# Patient Record
Sex: MALE | Race: WHITE | NOT HISPANIC OR LATINO | Employment: FULL TIME | URBAN - METROPOLITAN AREA
[De-identification: names, ages, dates, MRNs, and addresses within clinical notes are randomized per-mention and may not be internally consistent; named-entity substitution may affect disease eponyms.]

---

## 2017-01-05 ENCOUNTER — ALLSCRIPTS OFFICE VISIT (OUTPATIENT)
Dept: OTHER | Facility: OTHER | Age: 37
End: 2017-01-05

## 2017-02-08 ENCOUNTER — ALLSCRIPTS OFFICE VISIT (OUTPATIENT)
Dept: OTHER | Facility: OTHER | Age: 37
End: 2017-02-08

## 2017-06-01 ENCOUNTER — ALLSCRIPTS OFFICE VISIT (OUTPATIENT)
Dept: OTHER | Facility: OTHER | Age: 37
End: 2017-06-01

## 2017-06-12 ENCOUNTER — TRANSCRIBE ORDERS (OUTPATIENT)
Dept: ADMINISTRATIVE | Facility: HOSPITAL | Age: 37
End: 2017-06-12

## 2017-06-12 DIAGNOSIS — M54.31 SCIATICA OF RIGHT SIDE: Primary | ICD-10-CM

## 2017-06-23 ENCOUNTER — ALLSCRIPTS OFFICE VISIT (OUTPATIENT)
Dept: OTHER | Facility: OTHER | Age: 37
End: 2017-06-23

## 2017-06-23 ENCOUNTER — HOSPITAL ENCOUNTER (OUTPATIENT)
Dept: RADIOLOGY | Facility: HOSPITAL | Age: 37
Discharge: HOME | End: 2017-06-23
Payer: COMMERCIAL

## 2017-06-23 DIAGNOSIS — M54.31 SCIATICA OF RIGHT SIDE: ICD-10-CM

## 2017-06-23 DIAGNOSIS — M25.551 PAIN IN RIGHT HIP: ICD-10-CM

## 2017-06-23 PROCEDURE — 73502 X-RAY EXAM HIP UNI 2-3 VIEWS: CPT

## 2017-06-26 ENCOUNTER — GENERIC CONVERSION - ENCOUNTER (OUTPATIENT)
Dept: OTHER | Facility: OTHER | Age: 37
End: 2017-06-26

## 2017-10-03 ENCOUNTER — ALLSCRIPTS OFFICE VISIT (OUTPATIENT)
Dept: OTHER | Facility: OTHER | Age: 37
End: 2017-10-03

## 2017-10-03 DIAGNOSIS — M54.50 LOW BACK PAIN: ICD-10-CM

## 2017-10-05 NOTE — PROGRESS NOTES
Assessment  1  Lumbar pain (724 2) (M54 5)    Plan  Chronic sciatica, right    · PredniSONE 10 MG Oral Tablet  Chronic sciatica, right, Hip pain, right    · Gabapentin 300 MG Oral Capsule  Lumbar pain    · Meloxicam 15 MG Oral Tablet; TAKE 1 TABLET BY MOUTH EVERY DAY   · * MRI LUMBAR SPINE WO CONTRAST; Status:Need Information - Financial Authorization; Requested  RTH:[id number];    · *1 - [facility]  pt 2-3 times per week 6 weeks evaluate and treat   Status: Active  Requested for: 68RKO1896  Care Summary provided  : Yes    Discussion/Summary  Discussion Summary:   1 patient with right-sided sciatica did not respond to home therapy and 2 courses of prednisone will Rx meloxicam will wean him off of gabapentin by taking 300 mg 1 tablet twice a day for 3 days then one tablet once at night for 3 days and then stopping, will order physical therapy we'll check MRI of the lumbar spine x-ray of the right hip was unremarkable continuing of home maintenance a core strengthening program stress once pain has resolved we'll see him back if not improved  Counseling Documentation With Imm: The patient was counseled regarding diagnostic results,-instructions for management,-risk factor reductions,-prognosis,-impressions,-risks and benefits of treatment options  Medication SE Review and Pt Understands Tx: Possible side effects of new medications were reviewed with the patient/guardian today  The treatment plan was reviewed with the patient/guardian  The patient/guardian understands and agrees with the treatment plan      Chief Complaint  Chief Complaint Free Text Note Form: Patient presents for f/u offers c/o pain and burning on his RT leg  History of Present Illness  HPI: right lower back to right shin front knee strap not effective   Sciatica, Chronic (Brief): The patient is being seen for worsening symptoms of chronic sciatica        Review of Systems  Complete-Male:   Constitutional: No fever or chills, feels well, no tiredness, no recent weight gain or weight loss  Eyes: No complaints of eye pain, no red eyes, no discharge from eyes, no itchy eyes  ENT: no complaints of earache, no hearing loss, no nosebleeds, no nasal discharge, no sore throat, no hoarseness  Cardiovascular: No complaints of slow heart rate, no fast heart rate, no chest pain, no palpitations, no leg claudication, no lower extremity  Respiratory: No complaints of shortness of breath, no wheezing, no cough, no SOB on exertion, no orthopnea or PND  Gastrointestinal: No complaints of abdominal pain, no constipation, no nausea or vomiting, no diarrhea or bloody stools  Genitourinary: No complaints of dysuria, no incontinence, no hesitancy, no nocturia, no genital lesion, no testicular pain  Musculoskeletal: as noted in HPI  Integumentary: as noted in HPI  Neurological: as noted in HPI  Active Problems  1  Abdominal pain (789 00) (R10 9)   2  Benign familial tremor (333 1) (G25 0)   3  Chondromalacia of right patella (717 7) (M22 41)   4  Chronic sciatica, right (724 3) (M54 31)   5  Dysplastic nevi (216 9) (D23 9)   6  Erectile dysfunction of non-organic origin (302 72) (F52 21)   7  Fatty liver disease, nonalcoholic (780 8) (J92 1)   8  Hip pain, right (719 45) (M25 551)   9  History of HTN (hypertension), benign (401 1) (I10)   10  Low HDL (under 40) (272 5) (E78 6)   11  Lumbar pain (724 2) (M54 5)   12  Mild intermittent asthma with acute exacerbation (493 92) (J45 21)   13  Obesity due to excess calories, unspecified obesity severity (278 00) (E66 09)   14  EDMOND on CPAP (327 23,V46 8) (G47 33,Z99 89)    Past Medical History  1  History of Difficulty attaining erection (607 84) (N52 9)   2  History of Fear of needles (300 29) (F40 298)   3  History of asthma (V12 69) (Z87 09)   4  History of backache (V13 59) (Z87 39)   5  History of kidney disease (V13 09) (Z87 448)   6  History of pneumonia (V12 61) (Z87 01)   7  History of varicella (V12 09) (Z86 19)   8  History of wheezing (V12 69) (Z87 898)   9  History of HTN (hypertension), benign (401 1) (I10)   10  History of Low HDL (under 40) (272 5) (E78 6)   11  History of Slow urinary stream (788 62) (R39 198)  Active Problems And Past Medical History Reviewed: The active problems and past medical history were reviewed and updated today  Surgical History  1  History of Root Canal  Surgical History Reviewed: The surgical history was reviewed and updated today  Family History  Mother    1  Family history of hypertension (V17 49) (Z82 49)   2  Family history of multiple sclerosis (V17 2) (Z82 0)   3  Family history of tremor (V17 2) (Z82 0)  Father    4  Family history of Gout, arthritis  Brother    5  Family history of multiple sclerosis (V17 2) (Z82 0)   6  Family history of tremor (V17 2) (Z82 0)  Grandmother    7  Family history of malignant neoplasm (V16 9) (Z80 9)  Grandfather    8  Family history of cerebrovascular accident (CVA) (V17 1) (Z82 3)  Aunt    5  Family history of asthma (V17 5) (Z82 5)  Paternal Uncle    8  Family history of multiple sclerosis (V17 2) (Z82 0)   11  Family history of tremor (V17 2) (Z82 0)  Family History Reviewed: The family history was reviewed and updated today  Social History   · Always uses seat belt   · Denies alcohol consumption (V49 89) (Z78 9)   · Never a smoker  Social History Reviewed: The social history was reviewed and updated today  The social history was reviewed and is unchanged  Current Meds   1  Cialis 20 MG Oral Tablet; TAKE 1 TABLET 1 HOUR BEFORE ACTIVITY AS NEEDED  Requested for:   26Vfi8900; Last Rx:99Dkj1200 Ordered   2  Culturelle Oral Capsule; Therapy: (Recorded:01Qlg9471) to Recorded   3  Gabapentin 300 MG Oral Capsule; TAKE 1 CAPSULE 3 times daily; Therapy: 68ERF8920 to (Evaluate:26Vnq6057)  Requested for: 23QIE1935; Last JH:60SYO9326   Ordered   4   Nighttime Sleep Aid TABS; TAKE 1 TABLET AT BEDTIME  MDD:50MG; Therapy: (Recorded:05Jan2017) to Recorded   5  PredniSONE 10 MG Oral Tablet; TAKE 4 TABLETS DAILY FOR 3 DAYS,3 TABLETS DAILY FOR 3   DAYS, 2 TABLETS DAILY FOR 3 DAYS AND 1 TABLET DAILY FOR 3 DAYS, THEN STOP; Therapy: 97KQP7635 to (Last Rx:23Jun2017)  Requested for: 64HKO7030 Ordered   6  ProAir  (90 Base) MCG/ACT Inhalation Aerosol Solution; INHALE 2 PUFFS EVERY 4 HOURS   AS NEEDED  Requested for: 57KNX5320; Last IE:10ROH1154 Ordered   7  Vitamin D3 2000 UNIT Oral Capsule; TAKE 1 CAPSULE EVERY DAY; Therapy: 67CHL4602 to (Rusty Lopez) Recorded   8  Vitamin E 400 UNIT Oral Capsule; Therapy: (Recorded:05Jan2017) to Recorded  Medication List Reviewed: The medication list was reviewed and updated today  Allergies  1  Penicillins  2  Dust   3  Mold    Vitals  Vital Signs    Recorded: 28AWU2457 05:00PM Recorded: 27PMQ9464 04:34PM   Temperature  98 7 F   Heart Rate  96   Systolic 392, LUE, Sitting    Diastolic 75, LUE, Sitting    Height  6 ft    Weight  270 lb    BMI Calculated  36 62   BSA Calculated  2 42   O2 Saturation  98     Physical Exam    Constitutional   General appearance: Abnormal   appears healthy-and-obese  Neck   Neck: Supple, symmetric, trachea midline, no masses  Thyroid: Normal, no thyromegaly  Pulmonary   Auscultation of lungs: Clear to auscultation  Cardiovascular   Auscultation of heart: Normal rate and rhythm, normal S1 and S2, no murmurs  Carotid pulses: 2+ bilaterally  Abdomen   Abdomen: Non-tender, no masses  Liver and spleen: No hepatomegaly or splenomegaly  Musculoskeletal Straight leg raising was 80° on the left and 45° on the right -Range of motion of the right and left hip was within normal limits  Skin Some dysplastic nevi on abdomen     Neurologic   Cranial nerves: Cranial nerves 2-12 intact  -intention tremor minimal    Psychiatric   Judgment and insight: Normal     Orientation to person, place and time: Normal  Recent and remote memory: Intact      Mood and affect: Normal        Future Appointments    Date/Time Provider Specialty Site   02/20/2018 04:45 PM Mike Perdomo DO Internal Medicine [facility] INTERNAL MED [address] [place] [address]     Signatures   Electronically signed by : Haven Johnson DO; Oct  4 2017  8:00AM EST                       (Author)

## 2018-01-12 VITALS
HEIGHT: 72 IN | SYSTOLIC BLOOD PRESSURE: 120 MMHG | HEART RATE: 96 BPM | OXYGEN SATURATION: 98 % | WEIGHT: 270 LBS | TEMPERATURE: 98.7 F | BODY MASS INDEX: 36.57 KG/M2 | DIASTOLIC BLOOD PRESSURE: 75 MMHG

## 2018-01-12 NOTE — RESULT NOTES
Verified Results  [facility] SMALL BOWEL [id number] 07:58AM Boby Sole Order Number: [id number]   Performing Comments: small bowel series/SBFT   - Patient Instructions: To schedule this appointment, please contact Central Scheduling at [phone]  Test Name Result Flag Reference   [place] SMALL BOWEL (Report)     SMALL BOWEL FOLLOW THROUGH     INDICATION: Outside institution abnormal CT examination  Intermittent abdominal pain  History of hepatic steatosis     COMPARISON:  There are no prior   studies at [address] [place] [address]  FLUOROSCOPY TIME: 0 9 minutes     IMAGES: 17     TECHNIQUE:   view of the abdomen was obtained  Oral contrast was then administered to the patient and followed through the small bowel to the colon utilizing overhead radiographs at periodic intervals  Spot imaging of the terminal ileum was also    performed  FINDINGS:      view of the abdomen is unremarkable  The stomach demonstrates normal contour without discernible filling defect  Normal caliber small bowel loops  There is a normal fold pattern and thickness  No intraluminal filling defects, abnormal bowel separation or fistula identified  The    transit time of approximately 1 hour 20 minutes is normal     Multiple spot views of the terminal ileum demonstrate no evidence of stricture or inflammatory change         IMPRESSION:     Normal small bowel follow-through       Workstation performed: [id number]     Signed by:   Pietro Gonzalez MD   5/27/16   710

## 2018-01-14 VITALS
SYSTOLIC BLOOD PRESSURE: 130 MMHG | OXYGEN SATURATION: 99 % | WEIGHT: 250.13 LBS | HEART RATE: 72 BPM | BODY MASS INDEX: 33.88 KG/M2 | HEIGHT: 72 IN | DIASTOLIC BLOOD PRESSURE: 78 MMHG | TEMPERATURE: 98.6 F

## 2018-01-14 VITALS
OXYGEN SATURATION: 96 % | DIASTOLIC BLOOD PRESSURE: 80 MMHG | HEIGHT: 72 IN | SYSTOLIC BLOOD PRESSURE: 128 MMHG | BODY MASS INDEX: 34.67 KG/M2 | TEMPERATURE: 98.6 F | WEIGHT: 256 LBS | HEART RATE: 99 BPM

## 2018-01-14 VITALS
SYSTOLIC BLOOD PRESSURE: 120 MMHG | BODY MASS INDEX: 33.74 KG/M2 | HEART RATE: 75 BPM | TEMPERATURE: 98.5 F | WEIGHT: 249.13 LBS | HEIGHT: 72 IN | DIASTOLIC BLOOD PRESSURE: 72 MMHG | OXYGEN SATURATION: 98 %

## 2018-01-14 VITALS
SYSTOLIC BLOOD PRESSURE: 120 MMHG | TEMPERATURE: 97.9 F | OXYGEN SATURATION: 99 % | DIASTOLIC BLOOD PRESSURE: 68 MMHG | HEART RATE: 90 BPM | WEIGHT: 262.25 LBS | HEIGHT: 72 IN | BODY MASS INDEX: 35.52 KG/M2

## 2018-01-17 NOTE — RESULT NOTES
Verified Results  * XR HIP/PELV 2-3 VWS RIGHT W PELVIS IF PERFORMED 40QLS5104 05:02PM Oziel NICOLE Order Number: [id number]     Test Name Result Flag Reference   * XR HIP/PELV 2-3 VWS RIGHT (Report)     RIGHT HIP     INDICATION: M54 31: Sciatica, right side   M25 551: Pain in right hip  History taken directly from the electronic ordering system  COMPARISON: None     VIEWS: AP pelvis and 2 coned down views of the hip     IMAGES: 3     FINDINGS:     There is no acute fracture or dislocation  No degenerative changes  No lytic or blastic lesions are seen  Soft tissues are unremarkable  IMPRESSION:     No acute osseous abnormality         Workstation performed: [id number]     Signed by:   Allen Cerda MD   6/26/17

## 2018-01-29 ENCOUNTER — TELEPHONE (OUTPATIENT)
Dept: INTERNAL MEDICINE CLINIC | Facility: CLINIC | Age: 38
End: 2018-01-29

## 2018-01-29 DIAGNOSIS — M54.32 SCIATICA OF LEFT SIDE: Primary | ICD-10-CM

## 2018-01-29 NOTE — TELEPHONE ENCOUNTER
Pt had an old order in allscripts for physical therapy for right sciatic pain that he put off til now  Pt needs a new referral, since they are only good for 30 days      Pt uses [address], apt [place] [address]

## 2018-01-30 ENCOUNTER — TELEPHONE (OUTPATIENT)
Dept: INTERNAL MEDICINE CLINIC | Facility: CLINIC | Age: 38
End: 2018-01-30

## 2018-01-30 NOTE — TELEPHONE ENCOUNTER
Pt stopped by,  Set up appointment for tomorrow with hussain Soler in a lot of sciatic pain and   Still has his meloxican 15 mg from last visit, but its not effective  Can he up the dose of this till tomorrow he is asking?

## 2018-01-31 ENCOUNTER — OFFICE VISIT (OUTPATIENT)
Dept: INTERNAL MEDICINE CLINIC | Facility: CLINIC | Age: 38
End: 2018-01-31
Payer: COMMERCIAL

## 2018-01-31 VITALS
OXYGEN SATURATION: 97 % | SYSTOLIC BLOOD PRESSURE: 120 MMHG | TEMPERATURE: 99.2 F | HEART RATE: 81 BPM | DIASTOLIC BLOOD PRESSURE: 75 MMHG

## 2018-01-31 DIAGNOSIS — M54.31 CHRONIC SCIATICA, RIGHT: Primary | ICD-10-CM

## 2018-01-31 PROBLEM — G25.0 BENIGN FAMILIAL TREMOR: Status: ACTIVE | Noted: 2017-01-05

## 2018-01-31 PROBLEM — M22.41 CHONDROMALACIA OF RIGHT PATELLA: Status: ACTIVE | Noted: 2017-02-08

## 2018-01-31 PROBLEM — D23.9 DYSPLASTIC NEVI: Status: ACTIVE | Noted: 2017-02-08

## 2018-01-31 PROBLEM — M25.551 HIP PAIN, RIGHT: Status: ACTIVE | Noted: 2017-06-23

## 2018-01-31 PROBLEM — E78.6 LOW HDL (UNDER 40): Status: ACTIVE | Noted: 2017-02-08

## 2018-01-31 PROCEDURE — 99213 OFFICE O/P EST LOW 20 MIN: CPT | Performed by: INTERNAL MEDICINE

## 2018-01-31 RX ORDER — TADALAFIL 20 MG/1
1 TABLET ORAL
COMMUNITY
End: 2018-02-20 | Stop reason: SDUPTHER

## 2018-01-31 RX ORDER — LACTOBACILLUS RHAMNOSUS GG 10B CELL
CAPSULE ORAL
COMMUNITY
End: 2022-08-08 | Stop reason: ALTCHOICE

## 2018-01-31 RX ORDER — GABAPENTIN 100 MG/1
CAPSULE ORAL
Qty: 30 CAPSULE | Refills: 1 | Status: SHIPPED | OUTPATIENT
Start: 2018-01-31 | End: 2018-08-02 | Stop reason: SDUPTHER

## 2018-01-31 RX ORDER — ACETAMINOPHEN 160 MG
1 TABLET,DISINTEGRATING ORAL DAILY
COMMUNITY
Start: 2017-01-05

## 2018-01-31 RX ORDER — MELOXICAM 15 MG/1
TABLET ORAL
COMMUNITY
Start: 2018-01-30 | End: 2018-01-31

## 2018-01-31 RX ORDER — ALBUTEROL SULFATE 90 UG/1
2 AEROSOL, METERED RESPIRATORY (INHALATION) EVERY 4 HOURS PRN
COMMUNITY
End: 2018-02-20 | Stop reason: SDUPTHER

## 2018-01-31 NOTE — PROGRESS NOTES
Assessment/Plan:    1  Patient with right sciatica burning pain in his leg will try and hold off on ibuprofen restart gabapentin increased to 3 tablets at night will be getting chiropractor treatments as well as home exercises once his pain resolves he can start weaning him off of the gabapentin by 1 tablet a week if he does not improve in the next month will get MRI may consider referral for facet or epidural injections  Patient had screening laboratory work may consider getting them if financially possible     Diagnoses and all orders for this visit:    Chronic sciatica, right    Other orders  -     tadalafil (CIALIS) 20 MG tablet; Take 1 tablet by mouth  -     Lactobacillus-Inulin ([address]) CAPS; Take by mouth  -     meloxicam (MOBIC) 15 mg tablet;   -     diphenhydrAMINE (NIGHTTIME SLEEP AID) 25 MG tablet; Take by mouth  -     albuterol (PROAIR HFA) 90 mcg/act inhaler; Inhale 2 puffs every 4 (four) hours as needed  -     Cholecalciferol (VITAMIN D3) 2000 units capsule; Take 1 capsule by mouth daily  -     gabapentin (NEURONTIN) 100 mg capsule; 1 tab hs for 3 days may increase by 1 every 3 days until max 3 tabs hs        The patient was counseled regarding instructions for management, risk factor reductions, patient and family education,impressions, risks and benefits of treatment options, side effects of medications, importance of compliance with treatment  The treatment plan was reviewed with the patient/guardian and patient/guardian understands and agrees with the treatment plan  Subjective:      Patient ID: Clifford Martínez is a 40 y o  male  Friday increased pain went to no burning pain 2-3 yesterday in AM 8-9        The following portions of the patient's history were reviewed and updated as appropriate:   He has no past medical history on file  ,   does not have any pertinent problems on file  ,   has no past surgical history on file  ,  family history is not on file  , reports that he has never smoked  He has never used smokeless tobacco  He reports that he does not drink alcohol or use drugs  ,  is allergic to dust mite extract; molds & smuts; and penicillins       Review of Systems   Constitutional: Negative for appetite change, chills, fatigue, fever and unexpected weight change  HENT: Negative for congestion, ear pain, facial swelling, hearing loss, mouth sores, nosebleeds, postnasal drip, rhinorrhea, sinus pain, sore throat, trouble swallowing and voice change  Eyes: Negative for pain, discharge, redness and visual disturbance  Respiratory: Negative for apnea, chest tightness, shortness of breath, wheezing and stridor  Cardiovascular: Negative for chest pain, palpitations and leg swelling  Gastrointestinal: Negative for abdominal distention, abdominal pain, blood in stool, constipation, diarrhea and vomiting  Endocrine: Negative for cold intolerance, heat intolerance, polydipsia, polyphagia and polyuria  Genitourinary: Negative for difficulty urinating, dysuria, flank pain, frequency, genital sores, hematuria and urgency  Musculoskeletal: Negative for arthralgias and back pain  Skin: Negative for rash and wound  Allergic/Immunologic: Negative for environmental allergies, food allergies and immunocompromised state  Neurological: Negative for dizziness, tremors, seizures, syncope, facial asymmetry, speech difficulty, weakness, light-headedness, numbness and headaches  Hematological: Negative for adenopathy  Does not bruise/bleed easily  Psychiatric/Behavioral: Negative for agitation, behavioral problems, dysphoric mood, hallucinations, self-injury, sleep disturbance and suicidal ideas  The patient is not hyperactive  Objective:     Physical Exam   Constitutional: He is oriented to person, place, and time  He appears well-developed     HENT:   Right Ear: External ear normal    Left Ear: External ear normal    Eyes: Right eye exhibits no discharge  Left eye exhibits no discharge  No scleral icterus  Neck: Carotid bruit is not present  No tracheal deviation present  No thyroid mass and no thyromegaly present  Cardiovascular: Normal rate, regular rhythm, normal heart sounds and intact distal pulses  Exam reveals no gallop and no friction rub  No murmur heard  Pulmonary/Chest: No respiratory distress  He has no wheezes  He has no rales  Musculoskeletal: He exhibits no edema  Straight leg raising on the right is decreased with pain straight leg raising on the left 90 degrees without pain has no weakness in the lower extremities   Lymphadenopathy:     He has no cervical adenopathy  Neurological: He is alert and oriented to person, place, and time  Coordination normal    Psychiatric: He has a normal mood and affect  His behavior is normal  Judgment and thought content normal    Nursing note and vitals reviewed

## 2018-02-08 DIAGNOSIS — G47.33 OBSTRUCTIVE SLEEP APNEA: ICD-10-CM

## 2018-02-08 DIAGNOSIS — I10 ESSENTIAL (PRIMARY) HYPERTENSION: ICD-10-CM

## 2018-02-08 DIAGNOSIS — G25.0 ESSENTIAL TREMOR: ICD-10-CM

## 2018-02-08 DIAGNOSIS — E78.6 LIPOPROTEIN DEFICIENCY: ICD-10-CM

## 2018-02-20 ENCOUNTER — OFFICE VISIT (OUTPATIENT)
Dept: INTERNAL MEDICINE CLINIC | Facility: CLINIC | Age: 38
End: 2018-02-20
Payer: COMMERCIAL

## 2018-02-20 VITALS
DIASTOLIC BLOOD PRESSURE: 75 MMHG | OXYGEN SATURATION: 97 % | TEMPERATURE: 97.4 F | SYSTOLIC BLOOD PRESSURE: 120 MMHG | RESPIRATION RATE: 16 BRPM | BODY MASS INDEX: 37.94 KG/M2 | HEIGHT: 71 IN | WEIGHT: 271 LBS | HEART RATE: 75 BPM

## 2018-02-20 DIAGNOSIS — M54.16 LUMBAR RADICULITIS: Primary | ICD-10-CM

## 2018-02-20 DIAGNOSIS — J45.909 MODERATE ASTHMA WITHOUT COMPLICATION, UNSPECIFIED WHETHER PERSISTENT: ICD-10-CM

## 2018-02-20 DIAGNOSIS — F52.21 ERECTILE DYSFUNCTION OF NON-ORGANIC ORIGIN: ICD-10-CM

## 2018-02-20 PROBLEM — R74.8 LOW SERUM HDL: Status: ACTIVE | Noted: 2018-02-20

## 2018-02-20 PROCEDURE — 99214 OFFICE O/P EST MOD 30 MIN: CPT | Performed by: INTERNAL MEDICINE

## 2018-02-20 RX ORDER — ALBUTEROL SULFATE 90 UG/1
AEROSOL, METERED RESPIRATORY (INHALATION)
Qty: 3 INHALER | Refills: 0 | Status: SHIPPED | OUTPATIENT
Start: 2018-02-20 | End: 2018-09-12 | Stop reason: SDUPTHER

## 2018-02-20 RX ORDER — TADALAFIL 20 MG/1
20 TABLET ORAL DAILY PRN
Qty: 15 TABLET | Refills: 0 | Status: SHIPPED | OUTPATIENT
Start: 2018-02-20 | End: 2018-10-31 | Stop reason: SDUPTHER

## 2018-02-20 NOTE — PROGRESS NOTES
Assessment/Plan:    1  Right lumbar radiculopathy patient is responding very slowly to treatment has had the pain for greater than 6 months will get MRI of the lumbar spine he does not completely respond to chiropractor treatment and conservative treatment will consider referral to pain management for consideration for epidural or facet injections he will be weaning off of his gabapentin 1 pill every week or so  2  Asthma is stable at this time present medications refill on meds  3  Low HDL diet lifestyle stressed which can start after his above lumbar radiculopathy has been resolved  4  Erectile dysfunction will continue Cialis     There are no diagnoses linked to this encounter  The patient was counseled regarding instructions for management, risk factor reductions, patient and family education,impressions, risks and benefits of treatment options, side effects of medications, importance of compliance with treatment  The treatment plan was reviewed with the patient/guardian and patient/guardian understands and agrees with the treatment plan  Subjective:      Patient ID: Derrick Smith is a 40 y o  male  With gabapentin pain level 4-5 down from 7-8 down right leg now to knee only        The following portions of the patient's history were reviewed and updated as appropriate:   He has a past medical history of Asthma; Difficulty attaining erection; Fear of needles; HTN (hypertension); Kidney disease; Low HDL (under 40); and Wheezing ,   does not have any pertinent problems on file  ,   has a past surgical history that includes Root canal ,  family history includes Asthma in his family; Cancer in his family; Gout in his father; Hypertension in his mother; Multiple sclerosis in his brother, mother, and paternal uncle; Other in his family; Tremor in his brother, mother, and paternal uncle ,   reports that he has never smoked   He has never used smokeless tobacco  He reports that he does not drink alcohol or use drugs  ,  is allergic to dust mite extract; molds & smuts; and penicillins       Review of Systems   Constitutional: Negative for appetite change, chills, fatigue, fever and unexpected weight change  HENT: Negative for congestion, ear pain, facial swelling, hearing loss, mouth sores, nosebleeds, postnasal drip, rhinorrhea, sinus pain, sore throat, trouble swallowing and voice change  Eyes: Negative for pain, discharge, redness and visual disturbance  Respiratory: Negative for apnea, chest tightness, shortness of breath, wheezing and stridor  Cardiovascular: Negative for chest pain, palpitations and leg swelling  Gastrointestinal: Negative for abdominal distention, abdominal pain, blood in stool, constipation, diarrhea and vomiting  Endocrine: Negative for cold intolerance, heat intolerance, polydipsia, polyphagia and polyuria  Genitourinary: Negative for difficulty urinating, dysuria, flank pain, frequency, genital sores, hematuria and urgency  Musculoskeletal: Positive for back pain  Negative for arthralgias  Skin: Negative for rash and wound  Allergic/Immunologic: Negative for environmental allergies, food allergies and immunocompromised state  Neurological: Negative for dizziness, tremors, seizures, syncope, facial asymmetry, speech difficulty, weakness, light-headedness, numbness and headaches  Hematological: Negative for adenopathy  Does not bruise/bleed easily  Psychiatric/Behavioral: Negative for agitation, behavioral problems, dysphoric mood, hallucinations, self-injury, sleep disturbance and suicidal ideas  The patient is not hyperactive  Objective:     Physical Exam   Constitutional: He is oriented to person, place, and time  He appears well-developed  HENT:   Right Ear: External ear normal    Left Ear: External ear normal    Eyes: Right eye exhibits no discharge  Left eye exhibits no discharge  No scleral icterus  Neck: Carotid bruit is not present   No tracheal deviation present  No thyroid mass and no thyromegaly present  Cardiovascular: Normal rate, regular rhythm, normal heart sounds and intact distal pulses  Exam reveals no gallop and no friction rub  No murmur heard  Pulmonary/Chest: No respiratory distress  He has no wheezes  He has no rales  Musculoskeletal: He exhibits no edema  Lymphadenopathy:     He has no cervical adenopathy  Neurological: He is alert and oriented to person, place, and time  Coordination normal    Psychiatric: He has a normal mood and affect  His behavior is normal  Judgment and thought content normal    Nursing note and vitals reviewed

## 2018-02-23 ENCOUNTER — TELEPHONE (OUTPATIENT)
Dept: INTERNAL MEDICINE CLINIC | Facility: CLINIC | Age: 38
End: 2018-02-23

## 2018-02-23 NOTE — TELEPHONE ENCOUNTER
Called to say the [facility] pharmacy told him if the doctor does a prior auth on his cialis and also the same for the pro air inhaler please         Was given a # to prior auth at  [phone]     Ins will pay more towards medications apparantely

## 2018-03-27 ENCOUNTER — TELEPHONE (OUTPATIENT)
Dept: INTERNAL MEDICINE CLINIC | Facility: CLINIC | Age: 38
End: 2018-03-27

## 2018-03-27 NOTE — TELEPHONE ENCOUNTER
Please call [facility] to do Peer to Peer for MRI Lumbar Spine   Notes given do not support medical necessity, patient to seek further care, however if provider would like to do peer to peer please call #[phone]    Fax#[phone]

## 2018-08-01 ENCOUNTER — TELEPHONE (OUTPATIENT)
Dept: INTERNAL MEDICINE CLINIC | Facility: CLINIC | Age: 38
End: 2018-08-01

## 2018-08-02 ENCOUNTER — OFFICE VISIT (OUTPATIENT)
Dept: INTERNAL MEDICINE CLINIC | Facility: CLINIC | Age: 38
End: 2018-08-02
Payer: OTHER MISCELLANEOUS

## 2018-08-02 VITALS
WEIGHT: 258.9 LBS | OXYGEN SATURATION: 98 % | DIASTOLIC BLOOD PRESSURE: 75 MMHG | HEART RATE: 78 BPM | HEIGHT: 71 IN | BODY MASS INDEX: 36.24 KG/M2 | SYSTOLIC BLOOD PRESSURE: 120 MMHG | TEMPERATURE: 98.1 F | RESPIRATION RATE: 18 BRPM

## 2018-08-02 DIAGNOSIS — M54.16 LUMBAR RADICULITIS: Primary | ICD-10-CM

## 2018-08-02 DIAGNOSIS — M54.31 CHRONIC SCIATICA, RIGHT: ICD-10-CM

## 2018-08-02 PROCEDURE — 99213 OFFICE O/P EST LOW 20 MIN: CPT | Performed by: INTERNAL MEDICINE

## 2018-08-02 RX ORDER — GABAPENTIN 300 MG/1
CAPSULE ORAL
Qty: 30 CAPSULE | Refills: 3 | Status: SHIPPED | OUTPATIENT
Start: 2018-08-02 | End: 2018-08-17 | Stop reason: ALTCHOICE

## 2018-08-02 RX ORDER — PREDNISONE 10 MG/1
TABLET ORAL
Qty: 21 TABLET | Refills: 0 | Status: SHIPPED | OUTPATIENT
Start: 2018-08-02 | End: 2018-08-17 | Stop reason: ALTCHOICE

## 2018-08-02 RX ORDER — MELOXICAM 15 MG/1
15 TABLET ORAL DAILY
Qty: 30 TABLET | Refills: 2 | Status: SHIPPED | OUTPATIENT
Start: 2018-08-02 | End: 2018-08-27 | Stop reason: SDUPTHER

## 2018-08-02 NOTE — LETTER
August 2, 2018     Patient: General Riley   YOB: 1980   Date of Visit: 8/2/2018       To Whom it May Concern:    General Riley is under my professional care  He was seen in my office on 8/2/2018  He may return to work on 8/13/18  If you have any questions or concerns, please don't hesitate to call           Sincerely,          Ferdinand Trevino DO        CC: No Recipients

## 2018-08-02 NOTE — PROGRESS NOTES
Assessment/Plan:    1  Patient with exacerbation of his lumbar radiculitis will refer to chiropractic restart gabapentin 300 mg at night Rx prednisone tapering doses once off prednisone may start meloxicam to continue ice and heat intermittently will see him back in 1 week no work prescription given for until the 13th of August            Diagnoses and all orders for this visit:    Lumbar radiculitis  -     predniSONE 10 mg tablet; 2 twice daily for 3 days then 2 daily for 3 days then 1 daily for 3 days then stop  -     meloxicam (MOBIC) 15 mg tablet; Take 1 tablet (15 mg total) by mouth daily  -     Ambulatory referral to Chiropractic; Future    Chronic sciatica, right  -     gabapentin (NEURONTIN) 300 mg capsule; 1 tab hs for 3 days may increase by 1 every 3 days until max 3 tabs hs  -     predniSONE 10 mg tablet; 2 twice daily for 3 days then 2 daily for 3 days then 1 daily for 3 days then stop  -     meloxicam (MOBIC) 15 mg tablet; Take 1 tablet (15 mg total) by mouth daily  -     Ambulatory referral to Chiropractic; Future         Scheduled Meds:  Continuous Infusions:  No current facility-administered medications for this visit  PRN Meds:   Scheduled Meds:  Continuous Infusions:  No current facility-administered medications for this visit     Scheduled Meds:    Current Outpatient Prescriptions:     albuterol (PROAIR HFA) 90 mcg/act inhaler, Every 4 hours as needed for cough sob wheezing, Disp: 3 Inhaler, Rfl: 0    Cholecalciferol (VITAMIN D3) 2000 units capsule, Take 1 capsule by mouth daily, Disp: , Rfl:     diphenhydrAMINE (NIGHTTIME SLEEP AID) 25 MG tablet, Take by mouth, Disp: , Rfl:     gabapentin (NEURONTIN) 300 mg capsule, 1 tab hs for 3 days may increase by 1 every 3 days until max 3 tabs hs, Disp: 30 capsule, Rfl: 3    Lactobacillus-Inulin ([facility]) CAPS, Take by mouth, Disp: , Rfl:     meloxicam (MOBIC) 15 mg tablet, Take 1 tablet (15 mg total) by mouth daily, Disp: 30 tablet, Rfl: 2    predniSONE 10 mg tablet, 2 twice daily for 3 days then 2 daily for 3 days then 1 daily for 3 days then stop, Disp: 21 tablet, Rfl: 0    tadalafil (CIALIS) 20 MG tablet, Take 1 tablet (20 mg total) by mouth daily as needed for erectile dysfunction, Disp: 15 tablet, Rfl: 0      The patient was counseled regarding instructions for management, risk factor reductions, patient and family education,impressions, risks and benefits of treatment options, side effects of medications, importance of compliance with treatment  The treatment plan was reviewed with the patient/guardian and patient/guardian understands and agrees with the treatment plan  Subjective:      Patient ID: Elvin Unger is a 40 y o  male  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 low back pain out of work 2 days, pain level 7, had no pain since April  Some worse from taking meloxicam,  Ice and heat no effect down right leg pain        The following portions of the patient's history were reviewed and updated as appropriate:   He has a past medical history of Asthma; Difficulty attaining erection; Fear of needles; HTN (hypertension); Kidney disease; Low HDL (under 40); and Wheezing ,   does not have any pertinent problems on file  ,   has a past surgical history that includes Root canal ,  family history includes Asthma in his family; Cancer in his family; Gout in his father; Hypertension in his mother; Multiple sclerosis in his brother, mother, and paternal uncle; Other in his family; Tremor in his brother, mother, and paternal uncle ,   reports that he has never smoked  He has never used smokeless tobacco  He reports that he does not drink alcohol or use drugs  ,  is allergic to dust mite extract; molds & smuts; and penicillins       Review of Systems   Constitutional: Negative for appetite change, chills, fatigue, fever and unexpected weight change     HENT: Negative for congestion, ear pain, facial swelling, hearing loss, mouth sores, nosebleeds, postnasal drip, rhinorrhea, sinus pain, sore throat, trouble swallowing and voice change  Eyes: Negative for pain, discharge, redness and visual disturbance  Respiratory: Negative for apnea, chest tightness, shortness of breath, wheezing and stridor  Cardiovascular: Negative for chest pain, palpitations and leg swelling  Gastrointestinal: Negative for abdominal distention, abdominal pain, blood in stool, constipation, diarrhea and vomiting  Endocrine: Negative for cold intolerance, heat intolerance, polydipsia, polyphagia and polyuria  Genitourinary: Negative for difficulty urinating, dysuria, flank pain, frequency, genital sores, hematuria and urgency  Musculoskeletal: Positive for back pain  Negative for arthralgias  Skin: Negative for rash and wound  Allergic/Immunologic: Negative for environmental allergies, food allergies and immunocompromised state  Neurological: Negative for dizziness, tremors, seizures, syncope, facial asymmetry, speech difficulty, weakness, light-headedness, numbness and headaches  Hematological: Negative for adenopathy  Does not bruise/bleed easily  Psychiatric/Behavioral: Negative for agitation, behavioral problems, dysphoric mood, hallucinations, self-injury, sleep disturbance and suicidal ideas  The patient is not hyperactive  Objective:     Physical Exam   Constitutional: He is oriented to person, place, and time  He appears well-developed  HENT:   Right Ear: External ear normal    Left Ear: External ear normal    Eyes: Right eye exhibits no discharge  Left eye exhibits no discharge  No scleral icterus  Neck: Carotid bruit is not present  No tracheal deviation present  No thyroid mass and no thyromegaly present  Cardiovascular: Normal rate, regular rhythm, normal heart sounds and intact distal pulses  Exam reveals no gallop and no friction rub  No murmur heard  Pulmonary/Chest: No respiratory distress  He has no wheezes  He has no rales  Musculoskeletal: He exhibits no edema  Lymphadenopathy:     He has no cervical adenopathy  Neurological: He is alert and oriented to person, place, and time  Coordination normal    Motor strength was 5/5 proximally and distally in the lower extremities he is very uncomfortable can barely sit in the same spot   Psychiatric: He has a normal mood and affect  His behavior is normal  Judgment and thought content normal    Nursing note and vitals reviewed        Vitals:    08/02/18 1507 08/02/18 1532   BP:  120/75   BP Location:  Left arm   Patient Position:  Sitting   Pulse: 78    Resp: 18    Temp: 98 1 °F (36 7 °C)    TempSrc: Tympanic    SpO2: 98%    Weight: 117 kg (258 lb 14 4 oz)    Height: 5' 11" (1 803 m)

## 2018-08-09 ENCOUNTER — TELEPHONE (OUTPATIENT)
Dept: INTERNAL MEDICINE CLINIC | Facility: CLINIC | Age: 38
End: 2018-08-09

## 2018-08-09 DIAGNOSIS — M54.31 CHRONIC SCIATICA, RIGHT: Primary | ICD-10-CM

## 2018-08-09 NOTE — TELEPHONE ENCOUNTER
LAYLAI    Patient would like a new Referral for Chiropractic for right chronic sciatica  In the referred to section patient wants it changed to [facility] [place]  Patient has an apt tomorrow and will  referral then  Eladio Grossman

## 2018-08-10 ENCOUNTER — OFFICE VISIT (OUTPATIENT)
Dept: INTERNAL MEDICINE CLINIC | Facility: CLINIC | Age: 38
End: 2018-08-10
Payer: OTHER MISCELLANEOUS

## 2018-08-10 VITALS
DIASTOLIC BLOOD PRESSURE: 80 MMHG | RESPIRATION RATE: 18 BRPM | OXYGEN SATURATION: 96 % | HEART RATE: 76 BPM | WEIGHT: 264 LBS | SYSTOLIC BLOOD PRESSURE: 120 MMHG | HEIGHT: 71 IN | BODY MASS INDEX: 36.96 KG/M2 | TEMPERATURE: 97.8 F

## 2018-08-10 DIAGNOSIS — M54.16 LUMBAR RADICULITIS: Primary | ICD-10-CM

## 2018-08-10 PROCEDURE — 99213 OFFICE O/P EST LOW 20 MIN: CPT | Performed by: NURSE PRACTITIONER

## 2018-08-10 NOTE — LETTER
To whom it may concern,    Kaelyn Evens is under my care and will be unable to return to work until 8/20  Thank you,                    Keith CURRY

## 2018-08-10 NOTE — PATIENT INSTRUCTIONS
Lumbar radiculopathy-continue chiropractor, will be considering PT  Start Gabapentin and increase dose as needed  You can also continue Meloxicam  Will extend leave until 8/20 to give you time for the Gabapentin to start working  Follow up with me in one week

## 2018-08-10 NOTE — PROGRESS NOTES
Assessment/Plan:    Lumbar radiculopathy-continue chiropractor, will be considering PT  Start Gabapentin and increase dose as needed  You can also continue Meloxicam  Will extend leave until 8/20 to give you time for the Gabapentin to start working  Follow up with me in one week  Diagnoses and all orders for this visit:    Lumbar radiculitis        The patient was counseled regarding instructions for management, risk factor reductions, patient and family education,impressions, risks and benefits of treatment options, side effects of medications, importance of compliance with treatment  The treatment plan was reviewed with the patient/guardian and patient/guardian understands and agrees with the treatment plan  Current Outpatient Prescriptions:     albuterol (PROAIR HFA) 90 mcg/act inhaler, Every 4 hours as needed for cough sob wheezing, Disp: 3 Inhaler, Rfl: 0    Cholecalciferol (VITAMIN D3) 2000 units capsule, Take 1 capsule by mouth daily, Disp: , Rfl:     diphenhydrAMINE (NIGHTTIME SLEEP AID) 25 MG tablet, Take by mouth, Disp: , Rfl:     gabapentin (NEURONTIN) 300 mg capsule, 1 tab hs for 3 days may increase by 1 every 3 days until max 3 tabs hs, Disp: 30 capsule, Rfl: 3    Lactobacillus-Inulin ([address]) CAPS, Take by mouth, Disp: , Rfl:     meloxicam (MOBIC) 15 mg tablet, Take 1 tablet (15 mg total) by mouth daily, Disp: 30 tablet, Rfl: 2    predniSONE 10 mg tablet, 2 twice daily for 3 days then 2 daily for 3 days then 1 daily for 3 days then stop, Disp: 21 tablet, Rfl: 0    tadalafil (CIALIS) 20 MG tablet, Take 1 tablet (20 mg total) by mouth daily as needed for erectile dysfunction, Disp: 15 tablet, Rfl: 0    Subjective:      Patient ID: Giovanni Gilbert is a 40 y o  male  [place] is here for follow up  His back pain has improved but he still has difficulty driving and lifting  He is finishing up the prednisone and has been seeing a chiropractor   He has not been taking Gabapentin, but has been taking Meloxicam daily  The following portions of the patient's history were reviewed and updated as appropriate:   He has a past medical history of Asthma; Difficulty attaining erection; Fear of needles; HTN (hypertension); Kidney disease; Low HDL (under 40); and Wheezing ,   does not have any pertinent problems on file  ,   has a past surgical history that includes Root canal ,  family history includes Asthma in his family; Cancer in his family; Gout in his father; Hypertension in his mother; Multiple sclerosis in his brother, mother, and paternal uncle; Other in his family; Tremor in his brother, mother, and paternal uncle ,   reports that he has never smoked  He has never used smokeless tobacco  He reports that he does not drink alcohol or use drugs  ,  is allergic to dust mite extract; molds & smuts; and penicillins       Review of Systems   Constitutional: Negative  Respiratory: Negative  Cardiovascular: Negative  Musculoskeletal: Positive for back pain  Psychiatric/Behavioral: Negative  Objective:  /80 (BP Location: Left arm, Cuff Size: Large)   Pulse 76   Temp 97 8 °F (36 6 °C)   Resp 18   Ht 5' 11" (1 803 m)   Wt 120 kg (264 lb)   SpO2 96%   BMI 36 82 kg/m²     Lab Review  not applicable     Imaging: No results found  Physical Exam   Constitutional: He is oriented to person, place, and time  He appears well-developed and well-nourished  Cardiovascular: Normal rate, regular rhythm, normal heart sounds and intact distal pulses  Pulmonary/Chest: Effort normal and breath sounds normal    Musculoskeletal: Normal range of motion  Neurological: He is alert and oriented to person, place, and time  He has normal reflexes  Psychiatric: He has a normal mood and affect   His behavior is normal  Judgment and thought content normal

## 2018-08-14 ENCOUNTER — TELEPHONE (OUTPATIENT)
Dept: INTERNAL MEDICINE CLINIC | Facility: CLINIC | Age: 38
End: 2018-08-14

## 2018-08-14 NOTE — TELEPHONE ENCOUNTER
Please start prior auth on Lumbar Spine MRI  Patient has completed a 6 week therapy with chiropractic with no relief with [facility] [place]  Patient is still in pain, please refer to 8/10 apt with hussain Tello, was told we can go through [facility]  Pt has apt this Friday with hussain fisher, pt stated he would like a response of approval from insurance company by Friday, explained to patient prior auths can have up to a 5 day turn around time

## 2018-08-15 NOTE — TELEPHONE ENCOUNTER
Patient stated he made a mistake, that the prior auth needs to go through his Workers Comp Claim       Please contact Ostrandersimon Feng [phone] ext [phone]  CLAIM # [id number]    [facility]#[phone]

## 2018-08-15 NOTE — TELEPHONE ENCOUNTER
Prior-authorization started , Insurance needs record from his chiropractic office  Patient will bring records this Friday the 8/17

## 2018-08-17 ENCOUNTER — OFFICE VISIT (OUTPATIENT)
Dept: INTERNAL MEDICINE CLINIC | Facility: CLINIC | Age: 38
End: 2018-08-17
Payer: OTHER MISCELLANEOUS

## 2018-08-17 VITALS
OXYGEN SATURATION: 98 % | SYSTOLIC BLOOD PRESSURE: 130 MMHG | DIASTOLIC BLOOD PRESSURE: 80 MMHG | BODY MASS INDEX: 37.1 KG/M2 | HEIGHT: 71 IN | RESPIRATION RATE: 18 BRPM | WEIGHT: 265 LBS | TEMPERATURE: 97.6 F | HEART RATE: 94 BPM

## 2018-08-17 DIAGNOSIS — M54.16 LUMBAR RADICULITIS: Primary | ICD-10-CM

## 2018-08-17 DIAGNOSIS — M54.31 CHRONIC SCIATICA, RIGHT: ICD-10-CM

## 2018-08-17 PROCEDURE — 99214 OFFICE O/P EST MOD 30 MIN: CPT | Performed by: NURSE PRACTITIONER

## 2018-08-17 RX ORDER — GABAPENTIN 800 MG/1
800 TABLET ORAL 2 TIMES DAILY
Qty: 180 TABLET | Refills: 0 | Status: SHIPPED | OUTPATIENT
Start: 2018-08-17 | End: 2019-08-07 | Stop reason: ALTCHOICE

## 2018-08-17 NOTE — PATIENT INSTRUCTIONS
Lumbar radiculitis-Pain is consistent and worse with activity  Continue Meloxicam and seeing chiropractor  Will increase Gabapentin to 800 mg twice daily  We will try to secure an earlier MRI appointment for you and call you Monday

## 2018-08-17 NOTE — LETTER
To whom it may concern,          Lam Mehta is under my care and will be unable to return to work until 8/24/17  Thank you,            Keith CURRY

## 2018-08-17 NOTE — PROGRESS NOTES
Assessment/Plan:    Lumbar radiculitis-Pain is consistent and worse with activity  Continue Meloxicam  Will increase Gabapentin to 800 mg twice daily  We will try to secure an earlier MRI appointment for you and call you Monday  Diagnoses and all orders for this visit:    Lumbar radiculitis  -     gabapentin (NEURONTIN) 800 mg tablet; Take 1 tablet (800 mg total) by mouth 2 (two) times a day    Chronic sciatica, right  -     gabapentin (NEURONTIN) 800 mg tablet; Take 1 tablet (800 mg total) by mouth 2 (two) times a day        The patient was counseled regarding instructions for management, risk factor reductions, patient and family education,impressions, risks and benefits of treatment options, side effects of medications, importance of compliance with treatment  The treatment plan was reviewed with the patient/guardian and patient/guardian understands and agrees with the treatment plan  Current Outpatient Prescriptions:     albuterol (PROAIR HFA) 90 mcg/act inhaler, Every 4 hours as needed for cough sob wheezing, Disp: 3 Inhaler, Rfl: 0    Cholecalciferol (VITAMIN D3) 2000 units capsule, Take 1 capsule by mouth daily, Disp: , Rfl:     diphenhydrAMINE (NIGHTTIME SLEEP AID) 25 MG tablet, Take by mouth, Disp: , Rfl:     Lactobacillus-Inulin ([address]) CAPS, Take by mouth, Disp: , Rfl:     meloxicam (MOBIC) 15 mg tablet, Take 1 tablet (15 mg total) by mouth daily, Disp: 30 tablet, Rfl: 2    tadalafil (CIALIS) 20 MG tablet, Take 1 tablet (20 mg total) by mouth daily as needed for erectile dysfunction, Disp: 15 tablet, Rfl: 0    gabapentin (NEURONTIN) 800 mg tablet, Take 1 tablet (800 mg total) by mouth 2 (two) times a day, Disp: 180 tablet, Rfl: 0    Subjective:      Patient ID: Dominique Rashid is a 40 y o  male  [place] is here today with continuing low back pain which he rates as 4-5/10 when sitting but increases to 5-6/10 when walking   He has been taking Meloxicam in the morning and Gabapentin at bedtime  He has been wearing a back brace  He has an MRI scheduled for 8/27  He is seeing his chiropractor three times per week  The following portions of the patient's history were reviewed and updated as appropriate:   He has a past medical history of Asthma; Difficulty attaining erection; Fear of needles; HTN (hypertension); Kidney disease; Low HDL (under 40); and Wheezing ,   does not have any pertinent problems on file  ,   has a past surgical history that includes Root canal ,  family history includes Asthma in his family; Cancer in his family; Gout in his father; Hypertension in his mother; Multiple sclerosis in his brother, mother, and paternal uncle; Other in his family; Tremor in his brother, mother, and paternal uncle ,   reports that he has never smoked  He has never used smokeless tobacco  He reports that he does not drink alcohol or use drugs  ,  is allergic to dust mite extract; molds & smuts; and penicillins       Review of Systems   Constitutional: Negative  Respiratory: Negative  Cardiovascular: Negative  Musculoskeletal: Positive for back pain  Psychiatric/Behavioral: Negative  Objective:  /80 (Cuff Size: Large)   Pulse 94   Temp 97 6 °F (36 4 °C)   Resp 18   Ht 5' 11" (1 803 m)   Wt 120 kg (265 lb)   SpO2 98%   BMI 36 96 kg/m²     Lab Review  not applicable     Imaging: No results found  Physical Exam   Constitutional: He is oriented to person, place, and time  He appears well-developed and well-nourished  Cardiovascular: Normal rate, regular rhythm, normal heart sounds and intact distal pulses  Pulmonary/Chest: Effort normal and breath sounds normal    Musculoskeletal: Normal range of motion  Neurological: He is alert and oriented to person, place, and time  He has normal reflexes  Psychiatric: He has a normal mood and affect   His behavior is normal  Judgment and thought content normal

## 2018-08-22 ENCOUNTER — TELEPHONE (OUTPATIENT)
Dept: INTERNAL MEDICINE CLINIC | Facility: CLINIC | Age: 38
End: 2018-08-22

## 2018-08-22 NOTE — TELEPHONE ENCOUNTER
Karlee from [facility] pre encounter started pt's lumbar spine MRI needs to be prior auth'd through [employer], even though it is a workmen's comp claim  Please start prior auth, patient testing is tomorrow at [facility]

## 2018-08-23 ENCOUNTER — HOSPITAL ENCOUNTER (OUTPATIENT)
Dept: MRI IMAGING | Facility: HOSPITAL | Age: 38
Discharge: HOME | End: 2018-08-23
Attending: INTERNAL MEDICINE
Payer: OTHER MISCELLANEOUS

## 2018-08-23 ENCOUNTER — TELEPHONE (OUTPATIENT)
Dept: INTERNAL MEDICINE CLINIC | Facility: CLINIC | Age: 38
End: 2018-08-23

## 2018-08-23 DIAGNOSIS — M54.16 LUMBAR RADICULITIS: ICD-10-CM

## 2018-08-23 PROCEDURE — 72148 MRI LUMBAR SPINE W/O DYE: CPT

## 2018-08-23 NOTE — TELEPHONE ENCOUNTER
Patient is scheduled for his MRI 8/23/18 at 6pm, typically results will not come back earilest Monday  Patient next apt is 8/30/18 to review MRI and to get a note that he can go back to work  Elizabeth had taken patient out of work until 8/24/18 only, however with the prior Kecia Gene process it took pt longer to schedule the MRI than anticipated, patient is asking for an extension and to be out of work until 8/31/18  Can I write letter for patient to be out of work until 8/31/18, since his next apt is 8/30/18?

## 2018-08-24 DIAGNOSIS — M51.26 PROTRUSION OF LUMBAR INTERVERTEBRAL DISC: Primary | ICD-10-CM

## 2018-08-27 ENCOUNTER — OFFICE VISIT (OUTPATIENT)
Dept: INTERNAL MEDICINE CLINIC | Facility: CLINIC | Age: 38
End: 2018-08-27
Payer: OTHER MISCELLANEOUS

## 2018-08-27 VITALS
HEIGHT: 71 IN | DIASTOLIC BLOOD PRESSURE: 80 MMHG | BODY MASS INDEX: 38.08 KG/M2 | WEIGHT: 272 LBS | TEMPERATURE: 98.7 F | HEART RATE: 76 BPM | OXYGEN SATURATION: 98 % | RESPIRATION RATE: 18 BRPM | SYSTOLIC BLOOD PRESSURE: 130 MMHG

## 2018-08-27 DIAGNOSIS — M54.31 CHRONIC SCIATICA, RIGHT: ICD-10-CM

## 2018-08-27 DIAGNOSIS — M54.16 LUMBAR RADICULITIS: ICD-10-CM

## 2018-08-27 DIAGNOSIS — M51.26 PROTRUSION OF LUMBAR INTERVERTEBRAL DISC: Primary | ICD-10-CM

## 2018-08-27 PROCEDURE — 99213 OFFICE O/P EST LOW 20 MIN: CPT | Performed by: NURSE PRACTITIONER

## 2018-08-27 RX ORDER — MELOXICAM 15 MG/1
15 TABLET ORAL DAILY
Qty: 30 TABLET | Refills: 0 | Status: SHIPPED | OUTPATIENT
Start: 2018-08-27 | End: 2018-10-05 | Stop reason: SDUPTHER

## 2018-08-27 NOTE — LETTER
To whom it may concern,    Please excuse Froylan Benedict ( 1980) from work through 2018  Thank you,    Keith CURRY

## 2018-08-27 NOTE — PROGRESS NOTES
Assessment/Plan:    Protrusion of lumbar disc- Referred to Dr Dc Pedraza  Keep your appointment with Dr Aundrea Scruggs on Wednesday  Continue Meloxicam and Gabapentin  Work note given through Wednesday  Dr Aundrea Scruggs to determine if you are able to return to work moving forward  Diagnoses and all orders for this visit:    Protrusion of lumbar intervertebral disc    Chronic sciatica, right  -     meloxicam (MOBIC) 15 mg tablet; Take 1 tablet (15 mg total) by mouth daily    Lumbar radiculitis  -     meloxicam (MOBIC) 15 mg tablet; Take 1 tablet (15 mg total) by mouth daily    The patient was counseled regarding instructions for management, risk factor reductions, patient and family education,impressions, risks and benefits of treatment options, side effects of medications, importance of compliance with treatment  The treatment plan was reviewed with the patient/guardian and patient/guardian understands and agrees with the treatment plan  Current Outpatient Prescriptions:     albuterol (PROAIR HFA) 90 mcg/act inhaler, Every 4 hours as needed for cough sob wheezing, Disp: 3 Inhaler, Rfl: 0    Cholecalciferol (VITAMIN D3) 2000 units capsule, Take 1 capsule by mouth daily, Disp: , Rfl:     diphenhydrAMINE (NIGHTTIME SLEEP AID) 25 MG tablet, Take by mouth, Disp: , Rfl:     gabapentin (NEURONTIN) 800 mg tablet, Take 1 tablet (800 mg total) by mouth 2 (two) times a day, Disp: 180 tablet, Rfl: 0    Lactobacillus-Inulin ([address]) CAPS, Take by mouth, Disp: , Rfl:     meloxicam (MOBIC) 15 mg tablet, Take 1 tablet (15 mg total) by mouth daily, Disp: 30 tablet, Rfl: 0    tadalafil (CIALIS) 20 MG tablet, Take 1 tablet (20 mg total) by mouth daily as needed for erectile dysfunction, Disp: 15 tablet, Rfl: 0    Subjective:      Patient ID: Ahmet Escobedo is a 40 y o  male  Farzaneh Trinidad is here to review his MRI   He is improving slowly, still on Gabapentin and Meloxicam  He will be seeing Dr Aundrea Scruggs on Wednesday        The following portions of the patient's history were reviewed and updated as appropriate:   He has a past medical history of Asthma; Difficulty attaining erection; Fear of needles; HTN (hypertension); Kidney disease; Low HDL (under 40); and Wheezing ,   does not have any pertinent problems on file  ,   has a past surgical history that includes Root canal ,  family history includes Asthma in his family; Cancer in his family; Gout in his father; Hypertension in his mother; Multiple sclerosis in his brother, mother, and paternal uncle; Other in his family; Tremor in his brother, mother, and paternal uncle ,   reports that he has never smoked  He has never used smokeless tobacco  He reports that he does not drink alcohol or use drugs  ,  is allergic to dust mite extract; molds & smuts; and penicillins       Review of Systems   Constitutional: Negative  Respiratory: Negative  Cardiovascular: Negative  Musculoskeletal: Negative  Psychiatric/Behavioral: Negative  Objective:  /80 (BP Location: Left arm, Patient Position: Sitting, Cuff Size: Large)   Pulse 76   Temp 98 7 °F (37 1 °C) (Tympanic)   Resp 18   Ht 5' 11" (1 803 m)   Wt 123 kg (272 lb)   SpO2 98%   BMI 37 94 kg/m²     Lab Review  not applicable     Imaging: Mri Lumbar Spine Wo Contrast    Addendum Date: 8/24/2018 Addendum:   ADDENDUM: Linear stripe of high signal in the distal central cord  Nonurgent MR of the thoracic spine is suggested to exclude thoracic CORD pathology  Result Date: 8/24/2018  Narrative: MRI LUMBAR SPINE WITHOUT CONTRAST INDICATION: M54 16: Radiculopathy, lumbar region  Back pain radiates down right leg and groin, 1 month COMPARISON:  None  TECHNIQUE:  Sagittal T1, sagittal T2, sagittal inversion recovery, axial T1 and axial T2, coronal T2   IMAGE QUALITY:  Diagnostic FINDINGS: ALIGNMENT:  Mild straightening of normal lumbar lordosis  Minor left convex L3 apex scoliosis   MARROW SIGNAL: Normal marrow signal is identified within the visualized bony structures  No discrete marrow lesion  Benign-appearing 12 mm lesion present in the posterior right ilium  This is located adjacent to the SI joint  DISTAL CORD AND CONUS:  Thin central linear stripe of high signal identified within the distal cord from the mid T11 to the mid T12 level  This likely represents a prominent central canal   However, to exclude trailing end of more significant hydrosyringomyelia cavity, MR of the thoracic spine is suggested  The conus ends at the L1 level  PARASPINAL SOFT TISSUES:  Paraspinal soft tissues are unremarkable  SACRUM:  Normal signal within the sacrum  No evidence of insufficiency or stress fracture  LOWER THORACIC DISC SPACES:  Normal disc height and signal   No disc herniation, canal stenosis or foraminal narrowing  Minor degenerative disc disease T11-T12  LUMBAR DISC SPACES: L1-L2:  Normal  L2-L3:  Normal  L3-L4:  Large right foraminal disc protrusion, mass effect right L3 root  Correlate for right L3 radiculitis  L4-L5:  Small right foraminal protrusion in contact with but not displacing the L4 root  L5-S1:  Minor facet arthrosis     Impression: Large right foraminal  L3-L4 protrusion, mass effect left L3 root  Correlate for right L3 radiculitis  Workstation performed: [id number]          Physical Exam   Constitutional: He is oriented to person, place, and time  He appears well-developed and well-nourished  Cardiovascular: Normal rate, regular rhythm, normal heart sounds and intact distal pulses  Pulmonary/Chest: Effort normal and breath sounds normal    Musculoskeletal: Normal range of motion  Neurological: He is alert and oriented to person, place, and time  He has normal reflexes  Psychiatric: He has a normal mood and affect   His behavior is normal  Judgment and thought content normal

## 2018-08-27 NOTE — PATIENT INSTRUCTIONS
Protrusion of lumbar disc- Referred to Dr Moses Serrano  Keep your appointment with Dr Carole Jain on Wednesday  Continue Meloxicam and Gabapentin  Work note given through Wednesday  Dr Carole Jain to determine if you are able to return to work moving forward

## 2018-09-06 ENCOUNTER — OFFICE VISIT (OUTPATIENT)
Dept: NEUROSURGERY | Facility: CLINIC | Age: 38
End: 2018-09-06
Payer: OTHER MISCELLANEOUS

## 2018-09-06 VITALS
HEIGHT: 71 IN | HEART RATE: 77 BPM | TEMPERATURE: 97.3 F | RESPIRATION RATE: 16 BRPM | BODY MASS INDEX: 38.36 KG/M2 | DIASTOLIC BLOOD PRESSURE: 90 MMHG | SYSTOLIC BLOOD PRESSURE: 126 MMHG | WEIGHT: 274 LBS

## 2018-09-06 DIAGNOSIS — M79.604 RIGHT LEG PAIN: ICD-10-CM

## 2018-09-06 DIAGNOSIS — G89.29 CHRONIC RIGHT-SIDED LOW BACK PAIN WITH RIGHT-SIDED SCIATICA: ICD-10-CM

## 2018-09-06 DIAGNOSIS — M54.41 CHRONIC RIGHT-SIDED LOW BACK PAIN WITH RIGHT-SIDED SCIATICA: ICD-10-CM

## 2018-09-06 DIAGNOSIS — M51.26 PROTRUSION OF LUMBAR INTERVERTEBRAL DISC: ICD-10-CM

## 2018-09-06 DIAGNOSIS — M54.16 LUMBAR RADICULOPATHY: Primary | ICD-10-CM

## 2018-09-06 PROCEDURE — 99244 OFF/OP CNSLTJ NEW/EST MOD 40: CPT | Performed by: PHYSICIAN ASSISTANT

## 2018-09-06 NOTE — PROGRESS NOTES
Assessment/Plan:    Very pleasant 40-year-old male, referred by his primary care provider for exacerbation of right low back pain, right leg pain, and right groin pain, and to evaluate MRI lumbar spine  He reports acute exacerbation 7/31/18 while lifting a box with a printer, noted sudden onset of pain in the right low back and right leg  Bozena Mort He has had recent MRI of his lumbar spine 8/23/18  This study was carefully reviewed in detail by Dr Crystal Forte, and the abnormality identified at the L3-L4 foramina on the right represents pre ganglionic swelling of the nerve root  No significant disc herniation is identified  The balance of the study is otherwise unremarkable for central or foraminal stenosis, no significant disc herniations are identified  There are no lesions identified requiring neurosurgical intervention  On examination today, motor examination of the lower extremities is 5 x 5 per power, reflexes, patellar and Achilles are intact and symmetric, sensory examination sharp, dull is intact throughout, there is some diffuse spasm to palpation in the right low back  In addition there is some tenderness to palpation in the right sciatic notch  The patient denies gait or balance disturbance, motor or sensory difficulties in the upper or lower extremities, bowel or bladder incontinence, perineal anesthesia  Dr Crystal Forte opines this is most likely secondary to bending trauma, and not a result of disc herniation  He advises conservative management, a course of oral steroids, which has been completed, restart non steroid anti-inflammatories such as meloxicam, a course of physical therapy for core muscle strengthening and back dynamic training, consultation with pain management for DONNA is also advised, and some weight loss would also be advantageous  Note:   the patient has appointment he reports scheduled with Dr Angelique Gracia physiatrist, of [facility] in the next few days for [facility]  The patient is also advised to discuss physical therapy with Dr Jose Gordon  He also understands you meet with his primary care provider to discuss some method for weight loss  He also understands the physical therapy regimen he participated in should be, a lifelong home program as well  Further follow-up with Neurosurgery will be on an as-needed basis  These findings, impressions and recommendations are reviewed in great detail with the patient, he expressed understanding and agreement, his questions were answered completely and to his satisfaction  Diagnoses and all orders for this visit:    Lumbar radiculopathy    Chronic right-sided low back pain with right-sided sciatica    Right leg pain          Return if symptoms worsen or fail to improve  Subjective:      Patient ID: Elma Lopez is a 40 y o  male  Very pleasant 59-year-old male, presents for review of MRI lumbar spine relative to acute exacerbation of right low back pain and right leg pain  He reports he is employed as a , frequently moving computers, servers, printers, and other similar items  He has a history of low back pain and right leg pain since spring of 2017, this was managed conservatively with medications, and chiropractic care, with a good effect and resolved his symptoms until this recent exacerbation 7/31/18  As noted he reports this event occurred at work while removing a printer from a box  He currently is completing a prednisone taper he reports 3 tabs daily for 3 days, 2 tabs daily for 3 days, and 1 tab daily for 3 days he is on his last dose  He is also on gabapentin 800 mg twice daily  He had been on meloxicam in the past and was instructed to restart this after the completion of prednisone      He reports at this point approximately 3 weeks from the injury he has significant reduction in his symptoms, currently has pain seated greatest at 3 on a 1-10 scale, with ambulation 4-5 on a 1-10 scale, originally his pain had been typically 8-9 on a 1-10 scale  (he reports greater than 50% relief in his symptoms currently)    He has been following with a chiropractor, Dr Marya Kulkarni, who recently referred him to pain management, Dr Breezy Landry  He also follows with his primary care provider  The following portions of the patient's history were reviewed and updated as appropriate: allergies, current medications, past family history, past medical history, past social history and past surgical history  Review of Systems   Constitutional: Negative  HENT: Negative  Eyes: Negative  Respiratory: Negative  Cardiovascular: Negative  Gastrointestinal: Negative  Endocrine: Negative  Genitourinary: Negative  Musculoskeletal: Positive for back pain (radiates to right leg)  Negative for arthralgias, gait problem, joint swelling, myalgias, neck pain and neck stiffness  Skin: Negative  Negative for wound  Allergic/Immunologic: Negative  Neurological: Negative  Hematological: Negative  Psychiatric/Behavioral: Negative  Objective:    Physical Exam   Constitutional: He is oriented to person, place, and time  He appears well-developed and well-nourished  Obese male, BMI 38 2   HENT:   Head: Normocephalic and atraumatic  Eyes: EOM are normal  Pupils are equal, round, and reactive to light  Neck: Normal range of motion  Cardiovascular: Normal rate, regular rhythm and normal heart sounds  Pulmonary/Chest: Effort normal and breath sounds normal    Musculoskeletal: Normal range of motion  Direct exam low back;    Standing flexion to 90°  Strips seated straight leg raise 90° bilaterally  Able to easily lift on heels and toes  Francisco Javier's test is negative  Neurological: He is alert and oriented to person, place, and time  He has normal reflexes   He has a normal Romberg Test  Gait normal    Reflex Scores:       Patellar reflexes are 2+ on the right side and 2+ on the left side  Achilles reflexes are 2+ on the right side and 2+ on the left side  Skin: Skin is warm and dry  Psychiatric: He has a normal mood and affect  Neurologic Exam     Mental Status   Oriented to person, place, and time  Cranial Nerves     CN III, IV, VI   Pupils are equal, round, and reactive to light  Extraocular motions are normal      Motor Exam   Muscle bulk: normal  Overall muscle tone: normal  Right arm pronator drift: absent  Left arm pronator drift: absent    Strength   Right quadriceps: 5/5  Left quadriceps: 5/5  Right hamstrin/5  Left hamstrin/5  Right glutei: 5/5  Left glutei: 5/5  Right anterior tibial: 5/5  Left anterior tibial: 5/5  Right posterior tibial: 5/5  Left posterior tibial: 5/5  Right peroneal: 5/5  Left peroneal: 5/5  Right gastroc: 5/5  Left gastroc: 5/5    Sensory Exam   Light touch normal    Pinprick normal      Gait, Coordination, and Reflexes     Gait  Gait: normal    Coordination   Romberg: negative    Reflexes   Right patellar: 2+  Left patellar: 2+  Right achilles: 2+  Left achilles: 2+  Right ankle clonus: absent  Left ankle clonus: absent       MRI LUMBAR SPINE WITHOUT CONTRAST   18     INDICATION: [id number]: Radiculopathy, lumbar region  Back pain radiates down right leg and groin, 1 month     COMPARISON:  None      TECHNIQUE:  Sagittal T1, sagittal T2, sagittal inversion recovery, axial T1 and axial T2, coronal T2        IMAGE QUALITY:  Diagnostic     FINDINGS:     ALIGNMENT:  Mild straightening of normal lumbar lordosis  Minor left convex L3 apex scoliosis      MARROW SIGNAL:  Normal marrow signal is identified within the visualized bony structures  No discrete marrow lesion  Benign-appearing 12 mm lesion present in the posterior right ilium    This is located adjacent to the SI joint      DISTAL CORD AND CONUS:  Thin central linear stripe of high signal identified within the distal cord from the mid T11 to the mid T12 level  This likely represents a prominent central canal   However, to exclude trailing end of more significant   hydrosyringomyelia cavity, MR of the thoracic spine is suggested  The conus ends at the L1 level      PARASPINAL SOFT TISSUES:  Paraspinal soft tissues are unremarkable      SACRUM:  Normal signal within the sacrum  No evidence of insufficiency or stress fracture      LOWER THORACIC DISC SPACES:  Normal disc height and signal   No disc herniation, canal stenosis or foraminal narrowing  Minor degenerative disc disease T11-T12      LUMBAR DISC SPACES:     L1-L2:  Normal      L2-L3:  Normal      L3-L4:  Large right foraminal disc protrusion, mass effect right L3 root  Correlate for right L3 radiculitis      L4-L5:  Small right foraminal protrusion in contact with but not displacing the L4 root      L5-S1:  Minor facet arthrosis     IMPRESSION:     Large right foraminal  L3-L4 protrusion, mass effect left L3 root  Correlate for right L3 radiculitis

## 2018-09-06 NOTE — PATIENT INSTRUCTIONS
Continue with planned pain management, Dr Lisette Frias, including epidural steroid injection  A course of physical therapy is advised, this should include back dynamic training, and core muscle balance assessment and training, and include a home exercise regimen for back strengthening  Restarting of meloxicam is encouraged  Meeting with your primary care provider to discuss weight loss, this would be advantageous as discussed by Dr Samantha Ley    Further follow-up with Neurosurgery will be on an as-needed basis

## 2018-09-06 NOTE — LETTER
September 6, 2018     [facility], [address] [place] [address]    Patient: Carolann Khanna   YOB: 1980   Date of Visit: 9/6/2018       Dear Dr Richy Louis: Thank you for referring Carolann Khanna to me for evaluation  Below are my notes for this consultation  If you have questions, please do not hesitate to call me  I look forward to following your patient along with you  Sincerely,        Christie Best PA-C        CC: DO Quoc Heredia MD Marla Leander, PA-C  9/6/2018 11:46 AM  Sign at close encounter  Assessment/Plan:    Very pleasant 26-year-old male, referred by his primary care provider for exacerbation of right low back pain, right leg pain, and right groin pain, and to evaluate MRI lumbar spine  He reports acute exacerbation 7/31/18 while lifting a box with a printer, noted sudden onset of pain in the right low back and right leg  Lynita Ped He has had recent MRI of his lumbar spine 8/23/18  This study was carefully reviewed in detail by Dr Tucker Weathers, and the abnormality identified at the L3-L4 foramina on the right represents pre ganglionic swelling of the nerve root  No significant disc herniation is identified  The balance of the study is otherwise unremarkable for central or foraminal stenosis, no significant disc herniations are identified  There are no lesions identified requiring neurosurgical intervention  On examination today, motor examination of the lower extremities is 5 x 5 per power, reflexes, patellar and Achilles are intact and symmetric, sensory examination sharp, dull is intact throughout, there is some diffuse spasm to palpation in the right low back  In addition there is some tenderness to palpation in the right sciatic notch  The patient denies gait or balance disturbance, motor or sensory difficulties in the upper or lower extremities, bowel or bladder incontinence, perineal anesthesia      Dr Tucker Weathers opines this is most likely secondary to bending trauma, and not a result of disc herniation  He advises conservative management, a course of oral steroids, which has been completed, restart non steroid anti-inflammatories such as meloxicam, a course of physical therapy for core muscle strengthening and back dynamic training, consultation with pain management for DONNA is also advised, and some weight loss would also be advantageous  Note:   the patient has appointment he reports scheduled with Dr Luz Steele physiatrist, of [facility] in the next few days for [facility]  The patient is also advised to discuss physical therapy with Dr Lisette Frias  He also understands you meet with his primary care provider to discuss some method for weight loss  He also understands the physical therapy regimen he participated in should be, a lifelong home program as well  Further follow-up with Neurosurgery will be on an as-needed basis  These findings, impressions and recommendations are reviewed in great detail with the patient, he expressed understanding and agreement, his questions were answered completely and to his satisfaction  Diagnoses and all orders for this visit:    Lumbar radiculopathy    Chronic right-sided low back pain with right-sided sciatica    Right leg pain          Return if symptoms worsen or fail to improve  Subjective:      Patient ID: Laureano Dunham is a 40 y o  male  Very pleasant 49-year-old male, presents for review of MRI lumbar spine relative to acute exacerbation of right low back pain and right leg pain  He reports he is employed as a , frequently moving computers, servers, printers, and other similar items  He has a history of low back pain and right leg pain since spring of 2017, this was managed conservatively with medications, and chiropractic care, with a good effect and resolved his symptoms until this recent exacerbation 7/31/18      As noted he reports this event occurred at work while removing a printer from a box  He currently is completing a prednisone taper he reports 3 tabs daily for 3 days, 2 tabs daily for 3 days, and 1 tab daily for 3 days he is on his last dose  He is also on gabapentin 800 mg twice daily  He had been on meloxicam in the past and was instructed to restart this after the completion of prednisone  He reports at this point approximately 3 weeks from the injury he has significant reduction in his symptoms, currently has pain seated greatest at 3 on a 1-10 scale, with ambulation 4-5 on a 1-10 scale, originally his pain had been typically 8-9 on a 1-10 scale  (he reports greater than 50% relief in his symptoms currently)    He has been following with a chiropractor, Dr Elisa Bone, who recently referred him to pain management, Dr Aundrea Scruggs  He also follows with his primary care provider  The following portions of the patient's history were reviewed and updated as appropriate: allergies, current medications, past family history, past medical history, past social history and past surgical history  Review of Systems   Constitutional: Negative  HENT: Negative  Eyes: Negative  Respiratory: Negative  Cardiovascular: Negative  Gastrointestinal: Negative  Endocrine: Negative  Genitourinary: Negative  Musculoskeletal: Positive for back pain (radiates to right leg)  Negative for arthralgias, gait problem, joint swelling, myalgias, neck pain and neck stiffness  Skin: Negative  Negative for wound  Allergic/Immunologic: Negative  Neurological: Negative  Hematological: Negative  Psychiatric/Behavioral: Negative  Objective:    Physical Exam   Constitutional: He is oriented to person, place, and time  He appears well-developed and well-nourished  Obese male, BMI 38 2   HENT:   Head: Normocephalic and atraumatic     Eyes: EOM are normal  Pupils are equal, round, and reactive to light    Neck: Normal range of motion  Cardiovascular: Normal rate, regular rhythm and normal heart sounds  Pulmonary/Chest: Effort normal and breath sounds normal    Musculoskeletal: Normal range of motion  Direct exam low back;    Standing flexion to 90°  Strips seated straight leg raise 90° bilaterally  Able to easily lift on heels and toes  Francisco Javier's test is negative  Neurological: He is alert and oriented to person, place, and time  He has normal reflexes  He has a normal Romberg Test  Gait normal    Reflex Scores:       Patellar reflexes are 2+ on the right side and 2+ on the left side  Achilles reflexes are 2+ on the right side and 2+ on the left side  Skin: Skin is warm and dry  Psychiatric: He has a normal mood and affect  Neurologic Exam     Mental Status   Oriented to person, place, and time  Cranial Nerves     CN III, IV, VI   Pupils are equal, round, and reactive to light  Extraocular motions are normal      Motor Exam   Muscle bulk: normal  Overall muscle tone: normal  Right arm pronator drift: absent  Left arm pronator drift: absent    Strength   Right quadriceps: 5/5  Left quadriceps: 5/5  Right hamstrin/5  Left hamstrin/5  Right glutei: 5/5  Left glutei: 5/5  Right anterior tibial: 5/5  Left anterior tibial: 5/5  Right posterior tibial: 5/5  Left posterior tibial: 5/5  Right peroneal: 5/5  Left peroneal: 5/5  Right gastroc: 5/5  Left gastroc: 5/5    Sensory Exam   Light touch normal    Pinprick normal      Gait, Coordination, and Reflexes     Gait  Gait: normal    Coordination   Romberg: negative    Reflexes   Right patellar: 2+  Left patellar: 2+  Right achilles: 2+  Left achilles: 2+  Right ankle clonus: absent  Left ankle clonus: absent       MRI LUMBAR SPINE WITHOUT CONTRAST   18     INDICATION: [id number]: Radiculopathy, lumbar region     Back pain radiates down right leg and groin, 1 month     COMPARISON:  None      TECHNIQUE:  Sagittal T1, sagittal T2, sagittal inversion recovery, axial T1 and axial T2, coronal T2        IMAGE QUALITY:  Diagnostic     FINDINGS:     ALIGNMENT:  Mild straightening of normal lumbar lordosis  Minor left convex L3 apex scoliosis      MARROW SIGNAL:  Normal marrow signal is identified within the visualized bony structures  No discrete marrow lesion  Benign-appearing 12 mm lesion present in the posterior right ilium  This is located adjacent to the SI joint      DISTAL CORD AND CONUS:  Thin central linear stripe of high signal identified within the distal cord from the mid T11 to the mid T12 level  This likely represents a prominent central canal   However, to exclude trailing end of more significant   hydrosyringomyelia cavity, MR of the thoracic spine is suggested  The conus ends at the L1 level      PARASPINAL SOFT TISSUES:  Paraspinal soft tissues are unremarkable      SACRUM:  Normal signal within the sacrum  No evidence of insufficiency or stress fracture      LOWER THORACIC DISC SPACES:  Normal disc height and signal   No disc herniation, canal stenosis or foraminal narrowing  Minor degenerative disc disease T11-T12      LUMBAR DISC SPACES:     L1-L2:  Normal      L2-L3:  Normal      L3-L4:  Large right foraminal disc protrusion, mass effect right L3 root  Correlate for right L3 radiculitis      L4-L5:  Small right foraminal protrusion in contact with but not displacing the L4 root      L5-S1:  Minor facet arthrosis     IMPRESSION:     Large right foraminal  L3-L4 protrusion, mass effect left L3 root  Correlate for right L3 radiculitis

## 2018-09-12 ENCOUNTER — OFFICE VISIT (OUTPATIENT)
Dept: INTERNAL MEDICINE CLINIC | Facility: CLINIC | Age: 38
End: 2018-09-12
Payer: OTHER MISCELLANEOUS

## 2018-09-12 VITALS
TEMPERATURE: 98.8 F | SYSTOLIC BLOOD PRESSURE: 120 MMHG | RESPIRATION RATE: 18 BRPM | OXYGEN SATURATION: 97 % | HEIGHT: 71 IN | WEIGHT: 274.4 LBS | BODY MASS INDEX: 38.42 KG/M2 | HEART RATE: 86 BPM | DIASTOLIC BLOOD PRESSURE: 70 MMHG

## 2018-09-12 DIAGNOSIS — J45.909 MODERATE ASTHMA WITHOUT COMPLICATION, UNSPECIFIED WHETHER PERSISTENT: ICD-10-CM

## 2018-09-12 DIAGNOSIS — Z23 NEED FOR DIPHTHERIA-TETANUS-PERTUSSIS (TDAP) VACCINE: ICD-10-CM

## 2018-09-12 DIAGNOSIS — M51.26 PROTRUSION OF LUMBAR INTERVERTEBRAL DISC: ICD-10-CM

## 2018-09-12 DIAGNOSIS — Z23 NEED FOR PNEUMOCOCCAL VACCINE: Primary | ICD-10-CM

## 2018-09-12 DIAGNOSIS — Z23 NEED FOR INFLUENZA VACCINATION: Primary | ICD-10-CM

## 2018-09-12 DIAGNOSIS — M54.16 LUMBAR RADICULITIS: ICD-10-CM

## 2018-09-12 PROCEDURE — 99213 OFFICE O/P EST LOW 20 MIN: CPT | Performed by: INTERNAL MEDICINE

## 2018-09-12 RX ORDER — ALBUTEROL SULFATE 90 UG/1
AEROSOL, METERED RESPIRATORY (INHALATION)
Qty: 3 INHALER | Refills: 2 | Status: SHIPPED | OUTPATIENT
Start: 2018-09-12 | End: 2018-10-31 | Stop reason: SDUPTHER

## 2018-09-12 NOTE — PROGRESS NOTES
Assessment/Plan:    1  Patient with lumbar radiculopathy looks like a disc protrusion neurosurgery felt it may be a ganglion swelling patient is scheduled for an epidural injection but his pain is improved to a level 2 would recommend holding epidural injection at this time doing physical therapy for 2 weeks and if improved not to get the epidural injections and will start weaning off of 1st gabapentin and then discontinuing meloxicam will be calling if his pain improves and will need to call in a weaning doses of gabapentin continued follow-up with Dr Curt Lawrence encouraged         Diagnoses and all orders for this visit:    Need for influenza vaccination  -     SYRINGE/SINGLE-DOSE VIAL: influenza vaccine, [id number], quadrivalent, 0 5 mL, preservative-free, for patients 3+ yr (FLUZONE)    Lumbar radiculitis    Protrusion of lumbar intervertebral disc    Moderate asthma without complication, unspecified whether persistent  -     albuterol (PROAIR HFA) 90 mcg/act inhaler; Every 4 hours as needed for cough sob wheezing         Scheduled Meds:  Continuous Infusions:  No current facility-administered medications for this visit  PRN Meds:   Scheduled Meds:  Continuous Infusions:  No current facility-administered medications for this visit     Scheduled Meds:    Current Outpatient Prescriptions:     albuterol (PROAIR HFA) 90 mcg/act inhaler, Every 4 hours as needed for cough sob wheezing, Disp: 3 Inhaler, Rfl: 2    Cholecalciferol (VITAMIN D3) 2000 units capsule, Take 1 capsule by mouth daily, Disp: , Rfl:     diphenhydrAMINE (NIGHTTIME SLEEP AID) 25 MG tablet, Take by mouth, Disp: , Rfl:     gabapentin (NEURONTIN) 800 mg tablet, Take 1 tablet (800 mg total) by mouth 2 (two) times a day, Disp: 180 tablet, Rfl: 0    Lactobacillus-Inulin ([address]) CAPS, Take by mouth, Disp: , Rfl:     meloxicam (MOBIC) 15 mg tablet, Take 1 tablet (15 mg total) by mouth daily, Disp: 30 tablet, Rfl: 0    tadalafil (CIALIS) 20 MG tablet, Take 1 tablet (20 mg total) by mouth daily as needed for erectile dysfunction, Disp: 15 tablet, Rfl: 0      The patient was counseled regarding instructions for management, risk factor reductions, patient and family education,impressions, risks and benefits of treatment options, side effects of medications, importance of compliance with treatment  The treatment plan was reviewed with the patient/guardian and patient/guardian understands and agrees with the treatment plan  Subjective:      Patient ID: Andres Courtney is a 40 y o  male  Pain down to 2        The following portions of the patient's history were reviewed and updated as appropriate:   He has a past medical history of Asthma; Difficulty attaining erection; Fear of needles; HTN (hypertension); Kidney disease; Low HDL (under 40); and Wheezing ,   does not have any pertinent problems on file  ,   has a past surgical history that includes Root canal ,  family history includes Asthma in his family; Cancer in his family; Gout in his father; Hypertension in his mother; Multiple sclerosis in his brother, mother, and paternal uncle; Other in his family; Tremor in his brother, mother, and paternal uncle ,   reports that he has never smoked  He has never used smokeless tobacco  He reports that he does not drink alcohol or use drugs  ,  is allergic to dust mite extract; molds & smuts; and penicillins       Review of Systems   Constitutional: Negative for appetite change, chills, fatigue, fever and unexpected weight change  HENT: Negative for congestion, ear pain, facial swelling, hearing loss, mouth sores, nosebleeds, postnasal drip, rhinorrhea, sinus pain, sore throat, trouble swallowing and voice change  Eyes: Negative for pain, discharge, redness and visual disturbance  Respiratory: Negative for apnea, chest tightness, shortness of breath, wheezing and stridor      Cardiovascular: Negative for chest pain, palpitations and leg swelling  Gastrointestinal: Negative for abdominal distention, abdominal pain, blood in stool, constipation, diarrhea and vomiting  Endocrine: Negative for cold intolerance, heat intolerance, polydipsia, polyphagia and polyuria  Genitourinary: Negative for difficulty urinating, dysuria, flank pain, frequency, genital sores, hematuria and urgency  Musculoskeletal: Positive for back pain  Negative for arthralgias  Skin: Negative for rash and wound  Allergic/Immunologic: Negative for environmental allergies, food allergies and immunocompromised state  Neurological: Negative for dizziness, tremors, seizures, syncope, facial asymmetry, speech difficulty, weakness, light-headedness, numbness and headaches  Hematological: Negative for adenopathy  Does not bruise/bleed easily  Psychiatric/Behavioral: Negative for agitation, behavioral problems, dysphoric mood, hallucinations, self-injury, sleep disturbance and suicidal ideas  The patient is not hyperactive  Objective:     Physical Exam   Constitutional: He is oriented to person, place, and time  He appears well-developed  HENT:   Right Ear: External ear normal    Left Ear: External ear normal    Eyes: Right eye exhibits no discharge  Left eye exhibits no discharge  No scleral icterus  Neck: Carotid bruit is not present  No tracheal deviation present  No thyroid mass and no thyromegaly present  Cardiovascular: Normal rate, regular rhythm, normal heart sounds and intact distal pulses  Exam reveals no gallop and no friction rub  No murmur heard  Pulmonary/Chest: No respiratory distress  He has no wheezes  He has no rales  Musculoskeletal: He exhibits no edema  Lymphadenopathy:     He has no cervical adenopathy  Neurological: He is alert and oriented to person, place, and time  Coordination normal    Psychiatric: He has a normal mood and affect   His behavior is normal  Judgment and thought content normal    Nursing note and vitals reviewed        Vitals:    09/12/18 1652 09/12/18 1723   BP:  120/70   BP Location:  Left arm   Patient Position:  Sitting   Pulse: 86    Resp: 18    Temp: 98 8 °F (37 1 °C)    TempSrc: Tympanic    SpO2: 97%    Weight: 124 kg (274 lb 6 4 oz)    Height: 5' 11" (1 803 m)

## 2018-09-19 ENCOUNTER — TELEPHONE (OUTPATIENT)
Dept: INTERNAL MEDICINE CLINIC | Facility: CLINIC | Age: 38
End: 2018-09-19

## 2018-09-19 NOTE — TELEPHONE ENCOUNTER
Pt called to get a script for physical therapy sent over to [facility],   Doc talks about all this in his note of last visit but evidently never did a script in epic for him? ?

## 2018-09-20 DIAGNOSIS — M54.16 LUMBAR RADICULITIS: Primary | ICD-10-CM

## 2018-10-04 DIAGNOSIS — M54.31 CHRONIC SCIATICA, RIGHT: ICD-10-CM

## 2018-10-04 DIAGNOSIS — M54.16 LUMBAR RADICULITIS: ICD-10-CM

## 2018-10-05 DIAGNOSIS — M54.16 LUMBAR RADICULITIS: ICD-10-CM

## 2018-10-05 DIAGNOSIS — M54.31 CHRONIC SCIATICA, RIGHT: ICD-10-CM

## 2018-10-05 RX ORDER — MELOXICAM 15 MG/1
15 TABLET ORAL DAILY
Qty: 30 TABLET | Refills: 0 | Status: SHIPPED | OUTPATIENT
Start: 2018-10-05 | End: 2019-08-07 | Stop reason: ALTCHOICE

## 2018-10-05 RX ORDER — MELOXICAM 15 MG/1
TABLET ORAL
Qty: 30 TABLET | Refills: 0 | OUTPATIENT
Start: 2018-10-05

## 2018-10-05 NOTE — TELEPHONE ENCOUNTER
Per Dr Rose Mary Carcamo note, he should try to wean off the Gabapentin and then discontinue the Meloxicam  Meloxicam refilled for now

## 2018-10-25 ENCOUNTER — TELEPHONE (OUTPATIENT)
Dept: INTERNAL MEDICINE CLINIC | Facility: CLINIC | Age: 38
End: 2018-10-25

## 2018-10-25 NOTE — TELEPHONE ENCOUNTER
Pt has dropped the dosings of meds to 1 gabapentin a day and a meloxicam every other day         isnt sure how long till he totally stops    Pain is manageable at this pace for now

## 2018-10-30 ENCOUNTER — TELEPHONE (OUTPATIENT)
Dept: INTERNAL MEDICINE CLINIC | Facility: CLINIC | Age: 38
End: 2018-10-30

## 2018-10-30 DIAGNOSIS — F52.21 ERECTILE DYSFUNCTION OF NON-ORGANIC ORIGIN: ICD-10-CM

## 2018-10-30 DIAGNOSIS — Z99.89 OSA ON CPAP: ICD-10-CM

## 2018-10-30 DIAGNOSIS — J45.909 MODERATE ASTHMA WITHOUT COMPLICATION, UNSPECIFIED WHETHER PERSISTENT: ICD-10-CM

## 2018-10-30 DIAGNOSIS — G47.33 OSA ON CPAP: ICD-10-CM

## 2018-10-30 DIAGNOSIS — G47.33 OBSTRUCTIVE SLEEP APNEA SYNDROME: Primary | ICD-10-CM

## 2018-10-30 RX ORDER — TADALAFIL 20 MG/1
20 TABLET ORAL DAILY PRN
Qty: 15 TABLET | Refills: 0 | OUTPATIENT
Start: 2018-10-30

## 2018-10-30 NOTE — TELEPHONE ENCOUNTER
Patient needs an order for cpap supplies  Patient's insurance is running out Nov 1st needs order by end of day today

## 2018-10-31 RX ORDER — ALBUTEROL SULFATE 90 UG/1
AEROSOL, METERED RESPIRATORY (INHALATION)
Qty: 3 INHALER | Refills: 0 | Status: SHIPPED | OUTPATIENT
Start: 2018-10-31 | End: 2019-04-15 | Stop reason: SDUPTHER

## 2018-10-31 RX ORDER — TADALAFIL 20 MG/1
20 TABLET ORAL DAILY PRN
Qty: 15 TABLET | Refills: 0 | Status: SHIPPED | OUTPATIENT
Start: 2018-10-31 | End: 2019-08-13 | Stop reason: SDUPTHER

## 2018-11-26 DIAGNOSIS — M54.16 LUMBAR RADICULITIS: ICD-10-CM

## 2018-11-26 DIAGNOSIS — M54.31 CHRONIC SCIATICA, RIGHT: ICD-10-CM

## 2018-11-26 RX ORDER — GABAPENTIN 800 MG/1
800 TABLET ORAL 2 TIMES DAILY
Qty: 180 TABLET | Refills: 0 | OUTPATIENT
Start: 2018-11-26

## 2019-04-15 DIAGNOSIS — J45.909 MODERATE ASTHMA WITHOUT COMPLICATION, UNSPECIFIED WHETHER PERSISTENT: ICD-10-CM

## 2019-04-15 RX ORDER — ALBUTEROL SULFATE 90 UG/1
AEROSOL, METERED RESPIRATORY (INHALATION)
Qty: 54 INHALER | Refills: 0 | Status: SHIPPED | OUTPATIENT
Start: 2019-04-15 | End: 2019-08-13 | Stop reason: SDUPTHER

## 2019-07-17 DIAGNOSIS — J45.909 MODERATE ASTHMA WITHOUT COMPLICATION, UNSPECIFIED WHETHER PERSISTENT: ICD-10-CM

## 2019-07-17 RX ORDER — ALBUTEROL SULFATE 90 UG/1
AEROSOL, METERED RESPIRATORY (INHALATION)
Qty: 54 INHALER | Refills: 0 | Status: SHIPPED | OUTPATIENT
Start: 2019-07-17 | End: 2019-08-07 | Stop reason: SDUPTHER

## 2019-07-18 DIAGNOSIS — J45.909 MODERATE ASTHMA WITHOUT COMPLICATION, UNSPECIFIED WHETHER PERSISTENT: ICD-10-CM

## 2019-07-18 RX ORDER — ALBUTEROL SULFATE 90 UG/1
AEROSOL, METERED RESPIRATORY (INHALATION)
Qty: 54 INHALER | Refills: 0 | Status: CANCELLED | OUTPATIENT
Start: 2019-07-18

## 2019-08-07 ENCOUNTER — OFFICE VISIT (OUTPATIENT)
Dept: INTERNAL MEDICINE CLINIC | Facility: CLINIC | Age: 39
End: 2019-08-07
Payer: COMMERCIAL

## 2019-08-07 VITALS
TEMPERATURE: 98.3 F | SYSTOLIC BLOOD PRESSURE: 110 MMHG | HEART RATE: 76 BPM | BODY MASS INDEX: 36.76 KG/M2 | HEIGHT: 71 IN | OXYGEN SATURATION: 98 % | RESPIRATION RATE: 18 BRPM | WEIGHT: 262.6 LBS | DIASTOLIC BLOOD PRESSURE: 75 MMHG

## 2019-08-07 DIAGNOSIS — R14.0 ABDOMINAL BLOATING: ICD-10-CM

## 2019-08-07 DIAGNOSIS — G47.33 OSA ON CPAP: Primary | ICD-10-CM

## 2019-08-07 DIAGNOSIS — G25.0 BENIGN FAMILIAL TREMOR: ICD-10-CM

## 2019-08-07 DIAGNOSIS — F52.21 ERECTILE DYSFUNCTION OF NON-ORGANIC ORIGIN: ICD-10-CM

## 2019-08-07 DIAGNOSIS — Z82.3 FAMILY HISTORY OF STROKE: ICD-10-CM

## 2019-08-07 DIAGNOSIS — E73.9 LACTOSE INTOLERANCE: ICD-10-CM

## 2019-08-07 DIAGNOSIS — R74.8 LOW SERUM HDL: ICD-10-CM

## 2019-08-07 DIAGNOSIS — B00.1 RECURRENT COLD SORES: ICD-10-CM

## 2019-08-07 DIAGNOSIS — Z99.89 OSA ON CPAP: Primary | ICD-10-CM

## 2019-08-07 DIAGNOSIS — K76.0 FATTY LIVER DISEASE, NONALCOHOLIC: ICD-10-CM

## 2019-08-07 PROBLEM — M54.31 CHRONIC SCIATICA, RIGHT: Status: RESOLVED | Noted: 2017-06-01 | Resolved: 2019-08-07

## 2019-08-07 PROBLEM — M22.41 CHONDROMALACIA OF RIGHT PATELLA: Status: RESOLVED | Noted: 2017-02-08 | Resolved: 2019-08-07

## 2019-08-07 PROCEDURE — 3008F BODY MASS INDEX DOCD: CPT | Performed by: INTERNAL MEDICINE

## 2019-08-07 PROCEDURE — 99214 OFFICE O/P EST MOD 30 MIN: CPT | Performed by: INTERNAL MEDICINE

## 2019-08-07 RX ORDER — ACYCLOVIR 50 MG/G
OINTMENT TOPICAL
Qty: 15 G | Refills: 1 | Status: SHIPPED | OUTPATIENT
Start: 2019-08-07 | End: 2022-08-08 | Stop reason: SDUPTHER

## 2019-08-07 RX ORDER — VITAMIN E 268 MG
400 CAPSULE ORAL DAILY
Qty: 100 CAPSULE | Refills: 2 | Status: SHIPPED | OUTPATIENT
Start: 2019-08-07

## 2019-08-07 NOTE — PATIENT INSTRUCTIONS
Obesity   AMBULATORY CARE:   Obesity  is when your body mass index (BMI) is greater than 30  Your healthcare provider will use your height and weight to measure your BMI  The risks of obesity include  many health problems, such as injuries or physical disability  You may need tests to check for the following:  · Diabetes     · High blood pressure or high cholesterol     · Heart disease     · Gallbladder or liver disease     · Cancer of the colon, breast, prostate, liver, or kidney     · Sleep apnea     · Arthritis or gout  Seek care immediately if:   · You have a severe headache, confusion, or difficulty speaking  · You have weakness on one side of your body  · You have chest pain, sweating, or shortness of breath  Contact your healthcare provider if:   · You have symptoms of gallbladder or liver disease, such as pain in your upper abdomen  · You have knee or hip pain and discomfort while walking  · You have symptoms of diabetes, such as intense hunger and thirst, and frequent urination  · You have symptoms of sleep apnea, such as snoring or daytime sleepiness  · You have questions or concerns about your condition or care  Treatment for obesity  focuses on helping you lose weight to improve your health  Even a small decrease in BMI can reduce the risk for many health problems  Your healthcare provider will help you set a weight-loss goal   · Lifestyle changes  are the first step in treating obesity  These include making healthy food choices and getting regular physical activity  Your healthcare provider may suggest a weight-loss program that involves coaching, education, and therapy  · Medicine  may help you lose weight when it is used with a healthy diet and physical activity  · Surgery  can help you lose weight if you are very obese and have other health problems  There are several types of weight-loss surgery  Ask your healthcare provider for more information    Be successful losing weight:   · Set small, realistic goals  An example of a small goal is to walk for 20 minutes 5 days a week  Anther goal is to lose 5% of your body weight  · Tell friends, family members, and coworkers about your goals  and ask for their support  Ask a friend to lose weight with you, or join a weight-loss support group  · Identify foods or triggers that may cause you to overeat , and find ways to avoid them  Remove tempting high-calorie foods from your home and workplace  Place a bowl of fresh fruit on your kitchen counter  If stress causes you to eat, then find other ways to cope with stress  · Keep a diary to track what you eat and drink  Also write down how many minutes of physical activity you do each day  Weigh yourself once a week and record it in your diary  Eating changes: You will need to eat 500 to 1,000 fewer calories each day than you currently eat to lose 1 to 2 pounds a week  The following changes will help you cut calories:  · Eat smaller portions  Use small plates, no larger than 9 inches in diameter  Fill your plate half full of fruits and vegetables  Measure your food using measuring cups until you know what a serving size looks like  · Eat 3 meals and 1 or 2 snacks each day  Plan your meals in advance  [address] and eat at home most of the time  Eat slowly  · Eat fruits and vegetables at every meal   They are low in calories and high in fiber, which makes you feel full  Do not add butter, margarine, or cream sauce to vegetables  Use herbs to season steamed vegetables  · Eat less fat and fewer fried foods  Eat more baked or grilled chicken and fish  These protein sources are lower in calories and fat than red meat  Limit fast food  Dress your salads with olive oil and vinegar instead of bottled dressing  · Limit the amount of sugar you eat  Do not drink sugary beverages  Limit alcohol  Activity changes:  Physical activity is good for your body in many ways   It helps you burn calories and build strong muscles  It decreases stress and depression, and improves your mood  It can also help you sleep better  Talk to your healthcare provider before you begin an exercise program   · Exercise for at least 30 minutes 5 days a week  Start slowly  Set aside time each day for physical activity that you enjoy and that is convenient for you  It is best to do both weight training and an activity that increases your heart rate, such as walking, bicycling, or swimming  · Find ways to be more active  Do yard work and housecleaning  Walk up the stairs instead of using elevators  Spend your leisure time going to events that require walking, such as outdoor festivals or fairs  This extra physical activity can help you lose weight and keep it off  Follow up with your healthcare provider as directed: You may need to meet with a dietitian  Write down your questions so you remember to ask them during your visits  © 2017 [address] [employer] Information is for End User's use only and may not be sold, redistributed or otherwise used for commercial purposes  All illustrations and images included in CareNotes® are the copyrighted property of [employer]  or Saurav Clark  The above information is an  only  It is not intended as medical advice for individual conditions or treatments  Talk to your doctor, nurse or pharmacist before following any medical regimen to see if it is safe and effective for you  1 organic erectile dysfunction Cialis has been effective will get laboratory work to rule out other etiologies and refer to Urology  2  Obesity diet lifestyle stressed will consider getting the weight watchers apt to decrease calorie consumption encouraged to increase exercise, will Rx contrave to see if that is effective will see him back in 3 months hopefully of lost 15 lb by then  3   Patient with history of back pain with lumbar radiculopathy strongly recommend core strengthening exercises to prevent recurrence along with the Tim exercises  4  Benign familial tremor very mild at this time will just observe consider taking alcohol if tremor recurs to help diagnose familial tremor  5  Fatty liver disease will start vitamin-E 400 units a day low fat diet recommended will get ultrasound of the liver if liver function tests are abnormal  6  Family history of stroke diet lifestyle stressed    Non-Alcoholic Fatty Liver Disease   WHAT YOU NEED TO KNOW:   Non-alcoholic fatty liver disease (NAFLD) is a buildup of fat in your liver from a condition other than alcoholism  DISCHARGE INSTRUCTIONS:   Medicines:   · Medicines  may be given to manage blood sugar or cholesterol levels  · Take your medicine as directed  Contact your healthcare provider if you think your medicine is not helping or if you have side effects  Tell him or her if you are allergic to any medicine  Keep a list of the medicines, vitamins, and herbs you take  Include the amounts, and when and why you take them  Bring the list or the pill bottles to follow-up visits  Carry your medicine list with you in case of an emergency  Follow up with your healthcare provider as directed: You may need to return for more tests  You may also be referred to a specialist  Write down your questions so you remember to ask them during your visits  Manage NAFLD:   · Maintain a healthy weight  Ask your healthcare provider how much you should weigh  Ask him to help you create a weight loss plan if you are overweight  · Exercise  Aerobic exercise 3 times a week for 20 to 45 minutes can help decrease fat buildup in your liver  Examples are cycling, brisk walking, and jogging  Ask your healthcare provider about the best exercise plan for you  · Eat healthy foods  Examples are vegetables, fruit, whole-grain breads, low-fat dairy products, beans, lean meats, and fish   Foods low in simple carbohydrates, high fructose corn syrup, and trans fat may help decrease fat buildup in your liver  · Do not drink alcohol  Alcohol may make NAFLD worse and harm your liver  Contact your healthcare provider if:   · You have increased pain or swelling in your abdomen  · You feel more tired than usual     · You bruise or bleed easily  · Your skin or the whites of your eyes look yellow  · You have questions or concerns about your condition or care  Return to the emergency department if:   · You have shortness of breath  · You have trouble thinking clearly or are confused  · You feel lightheaded or faint  · You have shaking, chills, and a fever  © 2017 [facility] Information is for End User's use only and may not be sold, redistributed or otherwise used for commercial purposes  All illustrations and images included in CareNotes® are the copyrighted property of [employer]  or Saurav Clark  The above information is an  only  It is not intended as medical advice for individual conditions or treatments  Talk to your doctor, nurse or pharmacist before following any medical regimen to see if it is safe and effective for you

## 2019-08-07 NOTE — PROGRESS NOTES
Assessment/Plan:    1 organic erectile dysfunction Cialis has been effective will get laboratory work to rule out other etiologies and refer to Urology  2  Obesity diet lifestyle stressed will consider getting the weight watchers apt to decrease calorie consumption encouraged to increase exercise, will Rx contrave to see if that is effective will see him back in 3 months hopefully of lost 15 lb by then  3  Patient with history of back pain with lumbar radiculopathy strongly recommend core strengthening exercises to prevent recurrence along with the Tim exercises  4  Benign familial tremor very mild at this time will just observe consider taking alcohol if tremor recurs to help diagnose familial tremor  5  Fatty liver disease will start vitamin-E 400 units a day low fat diet recommended will get ultrasound of the liver if liver function tests are abnormal  6  Family history of stroke diet lifestyle stressed         Diagnoses and all orders for this visit:    EDMOND on CPAP  -     CBC and differential; Future  -     Comprehensive metabolic panel; Future  -     Hemoglobin A1C; Future  -     Lipid Panel with Direct LDL reflex; Future  -     LDL cholesterol, direct; Future  -     TSH, 3rd generation with Free T4 reflex; Future  -     Vitamin D 25 hydroxy; Future  -     Testosterone, free, total; Future  -     FSH and LH; Future  -     Prolactin; Future  -     Gamma GT; Future  -     Celiac Disease Antibody Profile; Future    Benign familial tremor  -     CBC and differential; Future  -     Comprehensive metabolic panel; Future  -     Hemoglobin A1C; Future  -     Lipid Panel with Direct LDL reflex; Future  -     LDL cholesterol, direct; Future  -     TSH, 3rd generation with Free T4 reflex; Future  -     Vitamin D 25 hydroxy; Future  -     Testosterone, free, total; Future  -     FSH and LH; Future  -     Prolactin; Future  -     Gamma GT; Future  -     Celiac Disease Antibody Profile;  Future    Erectile dysfunction of non-organic origin  -     Ambulatory referral to Urology; Future  -     CBC and differential; Future  -     Comprehensive metabolic panel; Future  -     Hemoglobin A1C; Future  -     Lipid Panel with Direct LDL reflex; Future  -     LDL cholesterol, direct; Future  -     TSH, 3rd generation with Free T4 reflex; Future  -     Vitamin D 25 hydroxy; Future  -     Testosterone, free, total; Future  -     FSH and LH; Future  -     Prolactin; Future  -     Gamma GT; Future  -     Celiac Disease Antibody Profile; Future    Family history of stroke mother 62  -     CBC and differential; Future  -     Comprehensive metabolic panel; Future  -     Hemoglobin A1C; Future  -     Lipid Panel with Direct LDL reflex; Future  -     LDL cholesterol, direct; Future  -     TSH, 3rd generation with Free T4 reflex; Future  -     Vitamin D 25 hydroxy; Future  -     Testosterone, free, total; Future  -     FSH and LH; Future  -     Prolactin; Future  -     Gamma GT; Future  -     Celiac Disease Antibody Profile; Future    Low serum HDL  -     CBC and differential; Future  -     Comprehensive metabolic panel; Future  -     Hemoglobin A1C; Future  -     Lipid Panel with Direct LDL reflex; Future  -     LDL cholesterol, direct; Future  -     TSH, 3rd generation with Free T4 reflex; Future  -     Vitamin D 25 hydroxy; Future  -     Testosterone, free, total; Future  -     FSH and LH; Future  -     Prolactin; Future  -     Gamma GT; Future  -     Celiac Disease Antibody Profile; Future    Fatty liver disease, nonalcoholic  -     CBC and differential; Future  -     Comprehensive metabolic panel; Future  -     Hemoglobin A1C; Future  -     Lipid Panel with Direct LDL reflex; Future  -     LDL cholesterol, direct; Future  -     TSH, 3rd generation with Free T4 reflex; Future  -     Vitamin D 25 hydroxy; Future  -     Testosterone, free, total; Future  -     FSH and LH; Future  -     Prolactin;  Future  -     Gamma GT; Future  -     vitamin E, tocopherol, 400 units capsule; Take 1 capsule (400 Units total) by mouth daily  -     Celiac Disease Antibody Profile; Future    Lactose intolerance  -     CBC and differential; Future  -     Comprehensive metabolic panel; Future  -     Hemoglobin A1C; Future  -     Lipid Panel with Direct LDL reflex; Future  -     LDL cholesterol, direct; Future  -     TSH, 3rd generation with Free T4 reflex; Future  -     Vitamin D 25 hydroxy; Future  -     Testosterone, free, total; Future  -     FSH and LH; Future  -     Prolactin; Future  -     Gamma GT; Future  -     Celiac Disease Antibody Profile; Future    Abdominal bloating  -     CBC and differential; Future  -     Comprehensive metabolic panel; Future  -     Hemoglobin A1C; Future  -     Lipid Panel with Direct LDL reflex; Future  -     LDL cholesterol, direct; Future  -     TSH, 3rd generation with Free T4 reflex; Future  -     Vitamin D 25 hydroxy; Future  -     Testosterone, free, total; Future  -     FSH and LH; Future  -     Prolactin; Future  -     Gamma GT; Future  -     Celiac Disease Antibody Profile; Future    Recurrent cold sores  -     CBC and differential; Future  -     Comprehensive metabolic panel; Future  -     Hemoglobin A1C; Future  -     Lipid Panel with Direct LDL reflex; Future  -     LDL cholesterol, direct; Future  -     TSH, 3rd generation with Free T4 reflex; Future  -     Vitamin D 25 hydroxy; Future  -     Testosterone, free, total; Future  -     FSH and LH; Future  -     Prolactin; Future  -     Gamma GT; Future  -     Celiac Disease Antibody Profile; Future  -     acyclovir (ZOVIRAX) 5 % ointment; Every 6 hours as needed    BMI 35 0-35 9,adult  -     Naltrexone-buPROPion HCl ER 8-90 MG TB12; 1 tab daily x 7 days, 1 tab twice daily x 7 days, 2 tabs in AM and 1 tab in PM x 7 days, then 2 tabs twice daily  -     CBC and differential; Future  -     Comprehensive metabolic panel;  Future  -     Hemoglobin A1C; Future  -     Lipid Panel with Direct LDL reflex; Future  -     LDL cholesterol, direct; Future  -     TSH, 3rd generation with Free T4 reflex; Future  -     Vitamin D 25 hydroxy; Future  -     Testosterone, free, total; Future  -     FSH and LH; Future  -     Prolactin; Future  -     Gamma GT; Future  -     Celiac Disease Antibody Profile; Future        The patient was counseled regarding instructions for management, risk factor reductions, patient and family education,impressions, risks and benefits of treatment options, side effects of medications, importance of compliance with treatment  The treatment plan was reviewed with the patient/guardian and patient/guardian understands and agrees with the treatment plan  Current Outpatient Medications:     albuterol (VENTOLIN HFA) 90 mcg/act inhaler, INHALE EVERY 4 HOURS AS NEEDED FOR COUGH FOR SHORTNESS OF BREATH OR WHEEZING, Disp: 54 Inhaler, Rfl: 0    diphenhydrAMINE (NIGHTTIME SLEEP AID) 25 MG tablet, Take by mouth, Disp: , Rfl:     Lactobacillus-Inulin ([address]) CAPS, Take by mouth, Disp: , Rfl:     tadalafil (CIALIS) 20 MG tablet, Take 1 tablet (20 mg total) by mouth daily as needed for erectile dysfunction, Disp: 15 tablet, Rfl: 0    acyclovir (ZOVIRAX) 5 % ointment, Every 6 hours as needed, Disp: 15 g, Rfl: 1    Cholecalciferol (VITAMIN D3) 2000 units capsule, Take 1 capsule by mouth daily, Disp: , Rfl:     Naltrexone-buPROPion HCl ER 8-90 MG TB12, 1 tab daily x 7 days, 1 tab twice daily x 7 days, 2 tabs in AM and 1 tab in PM x 7 days, then 2 tabs twice daily, Disp: 120 tablet, Rfl: 0    vitamin E, tocopherol, 400 units capsule, Take 1 capsule (400 Units total) by mouth daily, Disp: 100 capsule, Rfl: 2    Subjective:      Patient ID: Katelin Villanueva is a 45 y o  male      Has bloating with milk products      The following portions of the patient's history were reviewed and updated as appropriate:   He has a past medical history of Asthma, Difficulty attaining erection, Fear of needles, HTN (hypertension), Kidney disease, Low HDL (under 40), and Wheezing ,  does not have any pertinent problems on file  ,   has a past surgical history that includes Root canal ,  family history includes Asthma in his family; Cancer in his family; Gout in his father; Hypertension in his mother; Multiple sclerosis in his brother, mother, and paternal uncle; Other in his family; Tremor in his brother, mother, and paternal uncle ,   reports that he has never smoked  He has never used smokeless tobacco  He reports that he does not drink alcohol or use drugs  ,  is allergic to dust mite extract; molds & smuts; and penicillins       Review of Systems   Constitutional: Negative for appetite change, chills, fatigue, fever and unexpected weight change  HENT: Negative for congestion, ear pain, facial swelling, hearing loss, mouth sores, nosebleeds, postnasal drip, rhinorrhea, sinus pain, sore throat, trouble swallowing and voice change  Eyes: Negative for pain, discharge, redness and visual disturbance  Respiratory: Negative for apnea, chest tightness, shortness of breath, wheezing and stridor  Cardiovascular: Negative for chest pain, palpitations and leg swelling  Gastrointestinal: Positive for abdominal distention  Negative for abdominal pain, blood in stool, constipation, diarrhea and vomiting  Endocrine: Negative for cold intolerance, heat intolerance, polydipsia, polyphagia and polyuria  Genitourinary: Negative for difficulty urinating, dysuria, flank pain, frequency, genital sores, hematuria and urgency  Musculoskeletal: Negative for arthralgias and back pain  Skin: Negative for rash and wound  Allergic/Immunologic: Negative for environmental allergies, food allergies and immunocompromised state  Neurological: Positive for tremors  Negative for dizziness, seizures, syncope, facial asymmetry, speech difficulty, weakness, light-headedness, numbness and headaches  Hematological: Negative for adenopathy  Does not bruise/bleed easily  Psychiatric/Behavioral: Negative for agitation, behavioral problems, dysphoric mood, hallucinations, self-injury, sleep disturbance and suicidal ideas  The patient is not hyperactive  Objective:  /75 (BP Location: Left arm, Patient Position: Sitting)   Pulse 76   Temp 98 3 °F (36 8 °C) (Tympanic)   Resp 18   Ht 5' 11" (1 803 m)   Wt 119 kg (262 lb 9 6 oz)   SpO2 98%   BMI 36 63 kg/m²     Lab Review  No visits with results within 6 Month(s) from this visit  Latest known visit with results is:   No results found for any previous visit  Imaging  @[email or web address]@     No orders to display     No results found for this or any previous visit  Physical Exam   Constitutional: He is oriented to person, place, and time  He appears well-developed  HENT:   Right Ear: External ear normal    Left Ear: External ear normal    Eyes: Right eye exhibits no discharge  Left eye exhibits no discharge  No scleral icterus  Neck: Carotid bruit is not present  No tracheal deviation present  No thyroid mass and no thyromegaly present  Cardiovascular: Normal rate, regular rhythm, normal heart sounds and intact distal pulses  Exam reveals no gallop and no friction rub  No murmur heard  Pulmonary/Chest: No respiratory distress  He has no wheezes  He has no rales  Musculoskeletal: He exhibits no edema  Lymphadenopathy:     He has no cervical adenopathy  Neurological: He is alert and oriented to person, place, and time  Coordination normal    Psychiatric: He has a normal mood and affect  His behavior is normal  Judgment and thought content normal    Nursing note and vitals reviewed  BMI Counseling: Body mass index is 36 63 kg/m²  Discussed the patient's BMI with him  The BMI is above average  BMI counseling and education was provided to the patient   Nutrition recommendations include reducing portion sizes, decreasing overall calorie intake, 3-5 servings of fruits/vegetables daily, reducing fast food intake, consuming healthier snacks, decreasing soda and/or juice intake, moderation in carbohydrate intake, increasing intake of lean protein and reducing intake of saturated fat and trans fat  Exercise recommendations include moderate aerobic physical activity for 150 minutes/week, exercising 3-5 times per week and strength training exercises  BMI Counseling: Body mass index is 36 63 kg/m²  Discussed the patient's BMI with him  The BMI is above average  BMI counseling and education was provided to the patient  Nutrition recommendations include reducing portion sizes, decreasing overall calorie intake, 3-5 servings of fruits/vegetables daily, reducing fast food intake, consuming healthier snacks, decreasing soda and/or juice intake, moderation in carbohydrate intake, increasing intake of lean protein and reducing intake of saturated fat and trans fat  Exercise recommendations include moderate aerobic physical activity for 150 minutes/week, exercising 3-5 times per week and strength training exercises

## 2019-08-13 DIAGNOSIS — F52.21 ERECTILE DYSFUNCTION OF NON-ORGANIC ORIGIN: ICD-10-CM

## 2019-08-13 DIAGNOSIS — J45.909 MODERATE ASTHMA WITHOUT COMPLICATION, UNSPECIFIED WHETHER PERSISTENT: ICD-10-CM

## 2019-08-13 RX ORDER — ALBUTEROL SULFATE 90 UG/1
AEROSOL, METERED RESPIRATORY (INHALATION)
Qty: 54 INHALER | Refills: 0 | Status: SHIPPED | OUTPATIENT
Start: 2019-08-13 | End: 2020-03-17 | Stop reason: SDUPTHER

## 2019-08-13 RX ORDER — TADALAFIL 20 MG/1
20 TABLET ORAL DAILY PRN
Qty: 15 TABLET | Refills: 0 | Status: SHIPPED | OUTPATIENT
Start: 2019-08-13 | End: 2019-12-10 | Stop reason: SDUPTHER

## 2019-09-11 ENCOUNTER — OFFICE VISIT (OUTPATIENT)
Dept: INTERNAL MEDICINE CLINIC | Facility: CLINIC | Age: 39
End: 2019-09-11
Payer: COMMERCIAL

## 2019-09-11 VITALS
WEIGHT: 260 LBS | DIASTOLIC BLOOD PRESSURE: 70 MMHG | BODY MASS INDEX: 36.4 KG/M2 | HEIGHT: 71 IN | SYSTOLIC BLOOD PRESSURE: 115 MMHG | TEMPERATURE: 98.5 F | HEART RATE: 86 BPM | RESPIRATION RATE: 16 BRPM | OXYGEN SATURATION: 98 %

## 2019-09-11 DIAGNOSIS — M54.50 LUMBAR BACK PAIN: Primary | ICD-10-CM

## 2019-09-11 PROCEDURE — 3008F BODY MASS INDEX DOCD: CPT | Performed by: INTERNAL MEDICINE

## 2019-09-11 PROCEDURE — 99213 OFFICE O/P EST LOW 20 MIN: CPT | Performed by: INTERNAL MEDICINE

## 2019-09-11 RX ORDER — MELOXICAM 15 MG/1
15 TABLET ORAL DAILY
Qty: 30 TABLET | Refills: 0 | Status: SHIPPED | OUTPATIENT
Start: 2019-09-11 | End: 2019-10-05 | Stop reason: SDUPTHER
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
## 2019-09-25 ENCOUNTER — TELEPHONE (OUTPATIENT)
Dept: OTHER | Facility: OTHER | Age: 39
End: 2019-09-25

## 2019-09-27 NOTE — TELEPHONE ENCOUNTER
Pt called back to see about getting a letter for a chair for his lower back  If the patient can get this letter from the doctor his employer can buy him a chair  Please put "this will benefit the patient" on the letter as well  When the letter is completed please mail to patient is possible     [address] [phone]    Pt called [phone]

## 2019-10-01 NOTE — TELEPHONE ENCOUNTER
Can he take the meloxicam or to see ?
He can take meloxicam and use ice and heat with gentle stretching if not improved we can't have seen he should not use meloxicam for more than a month
In feb patient was seen for right lower back pain  Patient was seeing Chiropractor for treatment  Patient has left over meloxicam to take for the pain  Patient wondering if he can use the left over medication? Or what he should do, otc or at home to relieve the pain until he can get to the Chiropractor? Pt would like a call before noon 
Pt aware 
Pt work cell #[phone]
unknown

## 2019-10-05 DIAGNOSIS — M54.50 LUMBAR BACK PAIN: ICD-10-CM

## 2019-10-08 RX ORDER — MELOXICAM 15 MG/1
TABLET ORAL
Qty: 30 TABLET | Refills: 0 | Status: SHIPPED | OUTPATIENT
Start: 2019-10-08 | End: 2020-12-02 | Stop reason: ALTCHOICE

## 2019-10-30 ENCOUNTER — CONSULT (OUTPATIENT)
Dept: UROLOGY | Facility: CLINIC | Age: 39
End: 2019-10-30
Payer: COMMERCIAL

## 2019-10-30 VITALS
SYSTOLIC BLOOD PRESSURE: 140 MMHG | HEART RATE: 84 BPM | DIASTOLIC BLOOD PRESSURE: 86 MMHG | HEIGHT: 71 IN | BODY MASS INDEX: 37.35 KG/M2 | WEIGHT: 266.8 LBS

## 2019-10-30 DIAGNOSIS — F52.21 ERECTILE DYSFUNCTION OF NON-ORGANIC ORIGIN: ICD-10-CM

## 2019-10-30 PROCEDURE — 99244 OFF/OP CNSLTJ NEW/EST MOD 40: CPT | Performed by: UROLOGY
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
## 2019-12-10 DIAGNOSIS — F52.21 ERECTILE DYSFUNCTION OF NON-ORGANIC ORIGIN: ICD-10-CM

## 2019-12-10 NOTE — TELEPHONE ENCOUNTER
Pt wants to know update and trying to get his contrave weight loss med approved prior auth        also  [facility]

## 2019-12-11 RX ORDER — TADALAFIL 20 MG/1
20 TABLET ORAL DAILY PRN
Qty: 15 TABLET | Refills: 0 | Status: SHIPPED | OUTPATIENT
Start: 2019-12-11 | End: 2022-08-08 | Stop reason: ALTCHOICE

## 2020-03-17 DIAGNOSIS — J45.909 MODERATE ASTHMA WITHOUT COMPLICATION, UNSPECIFIED WHETHER PERSISTENT: ICD-10-CM

## 2020-03-19 RX ORDER — ALBUTEROL SULFATE 90 UG/1
AEROSOL, METERED RESPIRATORY (INHALATION)
Qty: 54 INHALER | Refills: 3 | Status: SHIPPED | OUTPATIENT
Start: 2020-03-19 | End: 2020-08-13 | Stop reason: SDUPTHER

## 2020-05-27 ENCOUNTER — TELEPHONE (OUTPATIENT)
Dept: INTERNAL MEDICINE CLINIC | Facility: CLINIC | Age: 40
End: 2020-05-27

## 2020-05-27 DIAGNOSIS — G47.33 OSA ON CPAP: Primary | ICD-10-CM

## 2020-05-27 DIAGNOSIS — Z99.89 OSA ON CPAP: Primary | ICD-10-CM

## 2020-08-13 ENCOUNTER — TELEPHONE (OUTPATIENT)
Dept: INTERNAL MEDICINE CLINIC | Facility: CLINIC | Age: 40
End: 2020-08-13

## 2020-08-13 DIAGNOSIS — J45.909 MODERATE ASTHMA WITHOUT COMPLICATION, UNSPECIFIED WHETHER PERSISTENT: ICD-10-CM

## 2020-08-13 RX ORDER — ALBUTEROL SULFATE 90 UG/1
AEROSOL, METERED RESPIRATORY (INHALATION)
Qty: 54 INHALER | Refills: 5 | Status: SHIPPED | OUTPATIENT
Start: 2020-08-13 | End: 2021-05-12 | Stop reason: SDUPTHER

## 2020-08-13 NOTE — TELEPHONE ENCOUNTER
Please call in one inhaler for Albuteral ventolin hfa inhaler 90 mcg to the [facility]    The pt cannot go through insurance at this time and will use [facility] to pick this one up please

## 2020-12-02 ENCOUNTER — TELEMEDICINE (OUTPATIENT)
Dept: INTERNAL MEDICINE CLINIC | Facility: CLINIC | Age: 40
End: 2020-12-02
Payer: COMMERCIAL

## 2020-12-02 VITALS
DIASTOLIC BLOOD PRESSURE: 86 MMHG | TEMPERATURE: 96.9 F | WEIGHT: 219 LBS | SYSTOLIC BLOOD PRESSURE: 131 MMHG | HEART RATE: 72 BPM | BODY MASS INDEX: 30.54 KG/M2

## 2020-12-02 DIAGNOSIS — R10.31 RIGHT LOWER QUADRANT ABDOMINAL PAIN: ICD-10-CM

## 2020-12-02 DIAGNOSIS — K76.0 FATTY LIVER DISEASE, NONALCOHOLIC: Primary | ICD-10-CM

## 2020-12-02 DIAGNOSIS — E66.09 OBESITY DUE TO EXCESS CALORIES, UNSPECIFIED OBESITY SEVERITY: ICD-10-CM

## 2020-12-02 DIAGNOSIS — Z99.89 OSA ON CPAP: ICD-10-CM

## 2020-12-02 DIAGNOSIS — G47.33 OSA ON CPAP: ICD-10-CM

## 2020-12-02 DIAGNOSIS — R74.8 LOW SERUM HDL: ICD-10-CM

## 2020-12-02 PROCEDURE — 99214 OFFICE O/P EST MOD 30 MIN: CPT | Performed by: NURSE PRACTITIONER

## 2020-12-04 ENCOUNTER — TELEPHONE (OUTPATIENT)
Dept: INTERNAL MEDICINE CLINIC | Facility: CLINIC | Age: 40
End: 2020-12-04

## 2020-12-05 ENCOUNTER — LAB (OUTPATIENT)
Dept: LAB | Facility: MEDICAL CENTER | Age: 40
End: 2020-12-05
Payer: COMMERCIAL

## 2020-12-05 ENCOUNTER — HOSPITAL ENCOUNTER (OUTPATIENT)
Dept: RADIOLOGY | Facility: MEDICAL CENTER | Age: 40
Discharge: HOME | End: 2020-12-05
Payer: COMMERCIAL

## 2020-12-05 DIAGNOSIS — K76.0 FATTY LIVER DISEASE, NONALCOHOLIC: ICD-10-CM

## 2020-12-05 DIAGNOSIS — R74.8 LOW SERUM HDL: ICD-10-CM

## 2020-12-05 DIAGNOSIS — R10.31 RIGHT LOWER QUADRANT ABDOMINAL PAIN: ICD-10-CM

## 2020-12-05 DIAGNOSIS — E66.09 OBESITY DUE TO EXCESS CALORIES, UNSPECIFIED OBESITY SEVERITY: ICD-10-CM

## 2020-12-05 LAB
ALBUMIN SERPL BCP-MCNC: 4.7 G/DL (ref 3.5–5)
ALP SERPL-CCNC: 64 U/L (ref 46–116)
ALT SERPL W P-5'-P-CCNC: 26 U/L (ref 12–78)
ANION GAP SERPL CALCULATED.3IONS-SCNC: 7 MMOL/L (ref 4–13)
AST SERPL W P-5'-P-CCNC: 10 U/L (ref 5–45)
BASOPHILS # BLD AUTO: 0.07 THOUSANDS/ΜL (ref 0–0.1)
BASOPHILS NFR BLD AUTO: 1 % (ref 0–1)
BILIRUB SERPL-MCNC: 1.17 MG/DL (ref 0.2–1)
BILIRUB UR QL STRIP: NEGATIVE
BUN SERPL-MCNC: 16 MG/DL (ref 5–25)
CALCIUM SERPL-MCNC: 9.9 MG/DL (ref 8.3–10.1)
CHLORIDE SERPL-SCNC: 106 MMOL/L (ref 100–108)
CLARITY UR: CLEAR
CO2 SERPL-SCNC: 27 MMOL/L (ref 21–32)
COLOR UR: YELLOW
CREAT SERPL-MCNC: 1.26 MG/DL (ref 0.6–1.3)
EOSINOPHIL # BLD AUTO: 0.06 THOUSAND/ΜL (ref 0–0.61)
EOSINOPHIL NFR BLD AUTO: 1 % (ref 0–6)
ERYTHROCYTE [DISTWIDTH] IN BLOOD BY AUTOMATED COUNT: 12.3 % (ref 11.6–15.1)
GFR SERPL CREATININE-BSD FRML MDRD: 71 ML/MIN/1.73SQ M
GLUCOSE P FAST SERPL-MCNC: 93 MG/DL (ref 65–99)
GLUCOSE UR STRIP-MCNC: NEGATIVE MG/DL
HCT VFR BLD AUTO: 50.1 % (ref 36.5–49.3)
HGB BLD-MCNC: 16.8 G/DL (ref 12–17)
HGB UR QL STRIP.AUTO: NEGATIVE
IMM GRANULOCYTES # BLD AUTO: 0.01 THOUSAND/UL (ref 0–0.2)
IMM GRANULOCYTES NFR BLD AUTO: 0 % (ref 0–2)
KETONES UR STRIP-MCNC: ABNORMAL MG/DL
LEUKOCYTE ESTERASE UR QL STRIP: NEGATIVE
LIPASE SERPL-CCNC: 88 U/L (ref 73–393)
LYMPHOCYTES # BLD AUTO: 1.3 THOUSANDS/ΜL (ref 0.6–4.47)
LYMPHOCYTES NFR BLD AUTO: 21 % (ref 14–44)
MCH RBC QN AUTO: 30.4 PG (ref 26.8–34.3)
MCHC RBC AUTO-ENTMCNC: 33.5 G/DL (ref 31.4–37.4)
MCV RBC AUTO: 91 FL (ref 82–98)
MONOCYTES # BLD AUTO: 0.41 THOUSAND/ΜL (ref 0.17–1.22)
MONOCYTES NFR BLD AUTO: 7 % (ref 4–12)
NEUTROPHILS # BLD AUTO: 4.48 THOUSANDS/ΜL (ref 1.85–7.62)
NEUTS SEG NFR BLD AUTO: 70 % (ref 43–75)
NITRITE UR QL STRIP: NEGATIVE
NRBC BLD AUTO-RTO: 0 /100 WBCS
PH UR STRIP.AUTO: 6 [PH]
PLATELET # BLD AUTO: 206 THOUSANDS/UL (ref 149–390)
PMV BLD AUTO: 10.1 FL (ref 8.9–12.7)
POTASSIUM SERPL-SCNC: 3.7 MMOL/L (ref 3.5–5.3)
PROT SERPL-MCNC: 8.4 G/DL (ref 6.4–8.2)
PROT UR STRIP-MCNC: NEGATIVE MG/DL
RBC # BLD AUTO: 5.53 MILLION/UL (ref 3.88–5.62)
SODIUM SERPL-SCNC: 140 MMOL/L (ref 136–145)
SP GR UR STRIP.AUTO: 1.01 (ref 1–1.03)
TSH SERPL DL<=0.05 MIU/L-ACNC: 1.09 UIU/ML (ref 0.36–3.74)
UROBILINOGEN UR QL STRIP.AUTO: 0.2 E.U./DL
WBC # BLD AUTO: 6.33 THOUSAND/UL (ref 4.31–10.16)

## 2020-12-05 PROCEDURE — 84443 ASSAY THYROID STIM HORMONE: CPT

## 2020-12-05 PROCEDURE — 80053 COMPREHEN METABOLIC PANEL: CPT

## 2020-12-05 PROCEDURE — 76700 US EXAM ABDOM COMPLETE: CPT

## 2020-12-05 PROCEDURE — 85025 COMPLETE CBC W/AUTO DIFF WBC: CPT

## 2020-12-05 PROCEDURE — 81003 URINALYSIS AUTO W/O SCOPE: CPT | Performed by: NURSE PRACTITIONER

## 2020-12-05 PROCEDURE — 83690 ASSAY OF LIPASE: CPT

## 2020-12-05 PROCEDURE — 36415 COLL VENOUS BLD VENIPUNCTURE: CPT

## 2020-12-10 ENCOUNTER — LAB (OUTPATIENT)
Dept: LAB | Facility: HOSPITAL | Age: 40
End: 2020-12-10
Payer: COMMERCIAL

## 2020-12-10 DIAGNOSIS — R74.8 LOW SERUM HDL: ICD-10-CM

## 2020-12-10 DIAGNOSIS — E66.09 OBESITY DUE TO EXCESS CALORIES, UNSPECIFIED OBESITY SEVERITY: ICD-10-CM

## 2020-12-10 DIAGNOSIS — K76.0 FATTY LIVER DISEASE, NONALCOHOLIC: ICD-10-CM

## 2020-12-10 DIAGNOSIS — R10.31 RIGHT LOWER QUADRANT ABDOMINAL PAIN: ICD-10-CM

## 2020-12-10 LAB — HEMOCCULT STL QL IA: NEGATIVE

## 2020-12-10 PROCEDURE — G0328 FECAL BLOOD SCRN IMMUNOASSAY: HCPCS

## 2021-05-12 DIAGNOSIS — J45.909 MODERATE ASTHMA WITHOUT COMPLICATION, UNSPECIFIED WHETHER PERSISTENT: ICD-10-CM

## 2021-05-12 RX ORDER — ALBUTEROL SULFATE 90 UG/1
AEROSOL, METERED RESPIRATORY (INHALATION)
Qty: 18 G | Refills: 1 | Status: SHIPPED | OUTPATIENT
Start: 2021-05-12 | End: 2021-05-13 | Stop reason: SDUPTHER

## 2021-05-12 NOTE — TELEPHONE ENCOUNTER
Albuterol 90 mcg/act inhaler-inhale every 4 hours as needed for cough for shortness of breath or wheezing    [facility]    Patient needs a new script due to the last script was under Dr Blanca Wilson and the pharmacy will not fill

## 2021-05-13 DIAGNOSIS — J45.909 MODERATE ASTHMA WITHOUT COMPLICATION, UNSPECIFIED WHETHER PERSISTENT: ICD-10-CM

## 2021-05-13 NOTE — TELEPHONE ENCOUNTER
Pt meant to ask for a 90 day supply of his inhaler its a lot cheaper through mail away     Please resend 90 day supply to [facility]

## 2021-05-17 DIAGNOSIS — J45.909 MODERATE ASTHMA WITHOUT COMPLICATION, UNSPECIFIED WHETHER PERSISTENT: ICD-10-CM

## 2021-05-17 RX ORDER — ALBUTEROL SULFATE 90 UG/1
AEROSOL, METERED RESPIRATORY (INHALATION)
Qty: 18 G | Refills: 1 | Status: SHIPPED | OUTPATIENT
Start: 2021-05-17 | End: 2021-05-17 | Stop reason: SDUPTHER

## 2021-05-17 RX ORDER — ALBUTEROL SULFATE 90 UG/1
AEROSOL, METERED RESPIRATORY (INHALATION)
Qty: 18 G | Refills: 1 | Status: SHIPPED | OUTPATIENT
Start: 2021-05-17 | End: 2021-05-18 | Stop reason: SDUPTHER

## 2021-05-17 NOTE — TELEPHONE ENCOUNTER
He was asking for his inhaler as a 90 day script to the local pharmacy so he can good rx it and get it a lot cheaper,  Can we resend in as 90 day supply?

## 2021-05-18 DIAGNOSIS — J45.909 MODERATE ASTHMA WITHOUT COMPLICATION, UNSPECIFIED WHETHER PERSISTENT: ICD-10-CM

## 2021-05-18 NOTE — TELEPHONE ENCOUNTER
Pt has asked many times for a corrected 90 day supply quantity of this Ventolin HFA inhaler,  Can a nurse ck it and make sure its set up as getting 3 inhalers worth for pt please and well have hussain do it tommorow    [facility]    hes leaving for [place] Thursday

## 2021-05-19 DIAGNOSIS — J45.909 MODERATE ASTHMA WITHOUT COMPLICATION, UNSPECIFIED WHETHER PERSISTENT: ICD-10-CM

## 2021-05-19 RX ORDER — ALBUTEROL SULFATE 90 UG/1
AEROSOL, METERED RESPIRATORY (INHALATION)
Qty: 54 G | Refills: 1 | Status: SHIPPED | OUTPATIENT
Start: 2021-05-19 | End: 2021-10-11 | Stop reason: SDUPTHER

## 2021-05-19 RX ORDER — ALBUTEROL SULFATE 90 UG/1
AEROSOL, METERED RESPIRATORY (INHALATION)
Qty: 54 G | Refills: 1 | Status: SHIPPED | OUTPATIENT
Start: 2021-05-19 | End: 2021-05-19 | Stop reason: SDUPTHER

## 2021-05-19 RX ORDER — ALBUTEROL SULFATE 90 UG/1
AEROSOL, METERED RESPIRATORY (INHALATION)
Refills: 1 | OUTPATIENT
Start: 2021-05-19

## 2021-10-11 ENCOUNTER — OFFICE VISIT (OUTPATIENT)
Dept: INTERNAL MEDICINE CLINIC | Facility: CLINIC | Age: 41
End: 2021-10-11
Payer: COMMERCIAL

## 2021-10-11 ENCOUNTER — TELEPHONE (OUTPATIENT)
Dept: INTERNAL MEDICINE CLINIC | Facility: CLINIC | Age: 41
End: 2021-10-11

## 2021-10-11 VITALS
TEMPERATURE: 98.6 F | OXYGEN SATURATION: 98 % | BODY MASS INDEX: 31.1 KG/M2 | WEIGHT: 223 LBS | SYSTOLIC BLOOD PRESSURE: 120 MMHG | HEART RATE: 76 BPM | DIASTOLIC BLOOD PRESSURE: 80 MMHG

## 2021-10-11 DIAGNOSIS — M25.469 KNEE SWELLING: Primary | ICD-10-CM

## 2021-10-11 DIAGNOSIS — J45.909 MODERATE ASTHMA WITHOUT COMPLICATION, UNSPECIFIED WHETHER PERSISTENT: ICD-10-CM

## 2021-10-11 DIAGNOSIS — J45.21 MILD INTERMITTENT ASTHMA WITH ACUTE EXACERBATION: ICD-10-CM

## 2021-10-11 PROBLEM — R14.0 ABDOMINAL BLOATING: Status: RESOLVED | Noted: 2019-08-07 | Resolved: 2021-10-11

## 2021-10-11 PROBLEM — R10.31 RIGHT LOWER QUADRANT ABDOMINAL PAIN: Status: RESOLVED | Noted: 2020-12-02 | Resolved: 2021-10-11

## 2021-10-11 PROCEDURE — 99214 OFFICE O/P EST MOD 30 MIN: CPT | Performed by: NURSE PRACTITIONER

## 2021-10-11 PROCEDURE — 3725F SCREEN DEPRESSION PERFORMED: CPT | Performed by: NURSE PRACTITIONER

## 2021-10-11 RX ORDER — ALBUTEROL SULFATE 90 UG/1
AEROSOL, METERED RESPIRATORY (INHALATION)
Qty: 54 G | Refills: 2 | Status: SHIPPED | OUTPATIENT
Start: 2021-10-11 | End: 2022-08-08 | Stop reason: SDUPTHER

## 2021-10-21 DIAGNOSIS — J45.909 MODERATE ASTHMA WITHOUT COMPLICATION, UNSPECIFIED WHETHER PERSISTENT: ICD-10-CM

## 2022-08-08 ENCOUNTER — OFFICE VISIT (OUTPATIENT)
Dept: INTERNAL MEDICINE CLINIC | Facility: CLINIC | Age: 42
End: 2022-08-08
Payer: COMMERCIAL

## 2022-08-08 VITALS
OXYGEN SATURATION: 98 % | DIASTOLIC BLOOD PRESSURE: 88 MMHG | BODY MASS INDEX: 33.77 KG/M2 | SYSTOLIC BLOOD PRESSURE: 132 MMHG | TEMPERATURE: 98.4 F | HEART RATE: 86 BPM | WEIGHT: 241.2 LBS | HEIGHT: 71 IN | RESPIRATION RATE: 18 BRPM

## 2022-08-08 DIAGNOSIS — G47.33 OSA ON CPAP: ICD-10-CM

## 2022-08-08 DIAGNOSIS — Z99.89 OSA ON CPAP: ICD-10-CM

## 2022-08-08 DIAGNOSIS — J45.21 MILD INTERMITTENT ASTHMA WITH ACUTE EXACERBATION: ICD-10-CM

## 2022-08-08 DIAGNOSIS — M54.50 ACUTE BILATERAL LOW BACK PAIN WITHOUT SCIATICA: ICD-10-CM

## 2022-08-08 DIAGNOSIS — Z01.00 VISIT FOR EYE AND VISION EXAM: ICD-10-CM

## 2022-08-08 DIAGNOSIS — B00.1 RECURRENT COLD SORES: ICD-10-CM

## 2022-08-08 DIAGNOSIS — R39.12 WEAK URINARY STREAM: ICD-10-CM

## 2022-08-08 DIAGNOSIS — J45.909 MODERATE ASTHMA WITHOUT COMPLICATION, UNSPECIFIED WHETHER PERSISTENT: ICD-10-CM

## 2022-08-08 DIAGNOSIS — L98.9 SKIN LESIONS: ICD-10-CM

## 2022-08-08 DIAGNOSIS — F40.298 FEAR OF NEEDLES: ICD-10-CM

## 2022-08-08 DIAGNOSIS — F52.21 ERECTILE DYSFUNCTION OF NON-ORGANIC ORIGIN: Primary | ICD-10-CM

## 2022-08-08 DIAGNOSIS — F41.9 ANXIETY: ICD-10-CM

## 2022-08-08 PROCEDURE — 99214 OFFICE O/P EST MOD 30 MIN: CPT | Performed by: NURSE PRACTITIONER

## 2022-08-08 PROCEDURE — 3725F SCREEN DEPRESSION PERFORMED: CPT | Performed by: NURSE PRACTITIONER

## 2022-08-08 RX ORDER — ALBUTEROL SULFATE 90 UG/1
AEROSOL, METERED RESPIRATORY (INHALATION)
Qty: 54 G | Refills: 2 | Status: SHIPPED | OUTPATIENT
Start: 2022-08-08 | End: 2022-09-28 | Stop reason: SDUPTHER

## 2022-08-08 RX ORDER — LORAZEPAM 1 MG/1
TABLET ORAL
Qty: 1 TABLET | Refills: 0 | Status: SHIPPED | OUTPATIENT
Start: 2022-08-08

## 2022-08-08 RX ORDER — SILDENAFIL 100 MG/1
100 TABLET, FILM COATED ORAL DAILY PRN
Qty: 10 TABLET | Refills: 0 | Status: SHIPPED | OUTPATIENT
Start: 2022-08-08 | End: 2022-09-05 | Stop reason: SDUPTHER

## 2022-08-08 RX ORDER — MELOXICAM 15 MG/1
15 TABLET ORAL DAILY
Qty: 30 TABLET | Refills: 0 | Status: SHIPPED | OUTPATIENT
Start: 2022-08-08 | End: 2022-09-07

## 2022-08-08 RX ORDER — ACYCLOVIR 50 MG/G
OINTMENT TOPICAL
Qty: 15 G | Refills: 1 | Status: SHIPPED | OUTPATIENT
Start: 2022-08-08

## 2022-08-08 NOTE — PROGRESS NOTES
Assessment/Plan:    1  Weak urinary stream, erectile dysfunction- To do a trial of Sildenafil  Referred to urology  2  Recurrent cold sores- Acyclovir has been effective, will continue  3  Asthma- Rarely needs rescue inhaler  4  Low back pain- Continue Meloxicam as needed, take with food and sparingly  Start physical therapy exercises at home  5  Sleep apnea- Will look into getting a new c-pap  6  Skin lesions- No red flags, referred to dermatology for full skin check  7  Anxiety- We discussed possible treatment for anxiety today  Please do not hesitate to reach out if you would like to start medication or if I can support you in any way  8  Fear of needles- OK to take Lorazepam prior to blood draw  Follow up in one year or sooner if needed  Diagnoses and all orders for this visit:    Erectile dysfunction of non-organic origin  -     sildenafil (VIAGRA) 100 mg tablet; Take 1 tablet (100 mg total) by mouth daily as needed for erectile dysfunction  -     Ambulatory Referral to Urology; Future    Weak urinary stream  -     Ambulatory Referral to Urology; Future    Recurrent cold sores  -     acyclovir (ZOVIRAX) 5 % ointment; Every 6 hours as needed    Acute bilateral low back pain without sciatica  -     meloxicam (Mobic) 15 mg tablet; Take 1 tablet (15 mg total) by mouth daily    Moderate asthma without complication, unspecified whether persistent  -     albuterol (Ventolin HFA) 90 mcg/act inhaler; INHALE 1-2 PUFFS EVERY 4 HOURS AS NEEDED FOR COUGH FOR SHORTNESS OF BREATH OR WHEEZING    EDMOND on CPAP  -     CBC and differential; Future  -     Comprehensive metabolic panel; Future    Mild intermittent asthma with acute exacerbation    Skin lesions  -     Ambulatory Referral to Dermatology; Future    Visit for eye and vision exam  -     Ambulatory Referral to Ophthalmology; Future    Fear of needles    Anxiety  -     LORazepam (ATIVAN) 1 mg tablet;  Take 1 tab po 30 minutes prior to lab work  -     TSH, 3rd generation with Free T4 reflex; Future    Other orders  -     Melatonin 5 MG CAPS; Take 5 mg by mouth daily at bedtime as needed        The patient was counseled regarding instructions for management, risk factor reductions, patient and family education,impressions, risks and benefits of treatment options, side effects of medications, importance of compliance with treatment  The treatment plan was reviewed with the patient/guardian and patient/guardian understands and agrees with the treatment plan  Current Outpatient Medications:     acyclovir (ZOVIRAX) 5 % ointment, Every 6 hours as needed, Disp: 15 g, Rfl: 1    albuterol (Ventolin HFA) 90 mcg/act inhaler, INHALE 1-2 PUFFS EVERY 4 HOURS AS NEEDED FOR COUGH FOR SHORTNESS OF BREATH OR WHEEZING, Disp: 54 g, Rfl: 2    Cholecalciferol (VITAMIN D3) 2000 units capsule, Take 1 capsule by mouth daily, Disp: , Rfl:     LORazepam (ATIVAN) 1 mg tablet, Take 1 tab po 30 minutes prior to lab work, Disp: 1 tablet, Rfl: 0    Melatonin 5 MG CAPS, Take 5 mg by mouth daily at bedtime as needed, Disp: , Rfl:     meloxicam (Mobic) 15 mg tablet, Take 1 tablet (15 mg total) by mouth daily, Disp: 30 tablet, Rfl: 0    sildenafil (VIAGRA) 100 mg tablet, Take 1 tablet (100 mg total) by mouth daily as needed for erectile dysfunction, Disp: 10 tablet, Rfl: 0    vitamin E, tocopherol, 400 units capsule, Take 1 capsule (400 Units total) by mouth daily, Disp: 100 capsule, Rfl: 2    Subjective:      Patient ID: Elida Irene is a 39 y o  male  Here with concerns about ED and weak stream when urinating  Also urinary frequency  Recent urgent care visit did UA which was clear  Still having low back pain, takes Meloxicam as needed        The following portions of the patient's history were reviewed and updated as appropriate:   He has a past medical history of Asthma, Difficulty attaining erection, Fear of needles, HTN (hypertension), Kidney disease, Low HDL (under 40), and Wheezing ,  does not have any pertinent problems on file  ,   has a past surgical history that includes Root canal ,  family history includes Asthma in his family; Cancer in his family; Gout in his father; Hypertension in his mother; Multiple sclerosis in his brother, mother, and paternal uncle; Other in his family; Tremor in his brother, mother, and paternal uncle ,   reports that he has never smoked  He has never used smokeless tobacco  He reports that he does not drink alcohol and does not use drugs  ,  is allergic to dust mite extract, molds & smuts, and penicillins       Review of Systems   Constitutional: Negative  Respiratory: Negative  Cardiovascular: Negative  Genitourinary: Positive for frequency  Weak stream   Musculoskeletal: Negative  Psychiatric/Behavioral: Negative  Objective:  /88 (BP Location: Left arm, Patient Position: Sitting, Cuff Size: Large)   Pulse 86   Temp 98 4 °F (36 9 °C) (Tympanic)   Resp 18   Ht 5' 11" (1 803 m)   Wt 109 kg (241 lb 3 2 oz)   SpO2 98%   BMI 33 64 kg/m²     Lab Review  No visits with results within 2 Month(s) from this visit  Latest known visit with results is:   Lab on 12/10/2020   Component Date Value    OCCULT BLD, FECAL IMMUNO* 12/10/2020 Negative       Imaging: No results found  Physical Exam  Constitutional:       Appearance: He is well-developed  Cardiovascular:      Rate and Rhythm: Normal rate and regular rhythm  Heart sounds: Normal heart sounds  Pulmonary:      Effort: Pulmonary effort is normal       Breath sounds: Normal breath sounds  Musculoskeletal:         General: Normal range of motion  Neurological:      Mental Status: He is alert and oriented to person, place, and time  Deep Tendon Reflexes: Reflexes are normal and symmetric  Psychiatric:         Behavior: Behavior normal          Thought Content:  Thought content normal          Judgment: Judgment normal

## 2022-08-08 NOTE — PATIENT INSTRUCTIONS
Weak urinary stream, erectile dysfunction- To do a trial of Sildenafil  Referred to urology  Recurrent cold sores- Acyclovir has been effective, will continue  Asthma- Rarely needs rescue inhaler  Low back pain- Continue Meloxicam as needed, take with food and sparingly  Start physical therapy exercises at home  Sleep apnea- Will look into getting a new c-pap  Skin lesions- No red flags, referred to dermatology for full skin check  Anxiety- We discussed possible treatment for anxiety today  Please do not hesitate to reach out if you would like to start medication or if I can support you in any way  Fear of needles- OK to take Lorazepam prior to blood draw  Follow up in one year or sooner if needed

## 2022-08-11 NOTE — PROGRESS NOTES
8/12/2022      Chief Complaint   Patient presents with    Erectile Dysfunction    Urinary Frequency     Assessment and Plan    1  ED  - Consistent with psychogenic etiology  He has good erections with masturbation as well as morning erections  - failed max dose of sildenafil and Cialis  - recommend couples/sex therapy, referral provided    - I reviewed option for ICI which she declines at this time as he has phobia of needles  - will check testosterone level given his young age  He denies any low libido or decreased sex drive  2  LUTS  - Recommend nighttime fluid restriction, avoiding bladder irritants and avoiding constipation  - he has combination of emptying and storage symptoms  We will trial of Flomax   - LUDMILA unremarkable  Will obtain PSA  - if ongoing symptoms recommend cystoscopy and transrectal ultrasound for further evaluation  - recommend avoiding shaving against the grain to prevent ingrown hairs and can use warm compress  No evidence of abscess  Lesion on penis is insistent with a small angiokeratomas and advised this is benign     - follow-up in 2-3 months for symptom reassessment  History of Present Illness  Zachery Carcamo is a 39 y o  male here for follow up evaluation of  Erectile dysfunction and new issues of lower urinary tract symptoms  He previously was seen for ED issues in 2019 and was prescribed Cialis  It was felt at that time issues were secondary to psychogenic component  More recently has been managed on Sildenafil 100 mg PRN  He states overall this is minimally effective  He reports Cialis was also ineffective  He denies any pain with erections or significant penile curvature  Denies any pain with ejaculation  He reports he does have adequate erections with masturbation as well as morning erections  He states he does feel anxiety about intercourse and recently there is some strain between him and his partner    He complains of urgency, frequency, weak urinary stream, and nocturia  He states this has worsened over the past 3 months  Denies any prior treatment for this  He denies any dysuria or hematuria  No prior  surgical manipulation  No family history of  malignancy  Denies smoking  He also complains of some pimple looking lesions in the pubic region after shaving  Also has a longstanding small bump on his glans penis that is nonpainful  AUA SYMPTOM SCORE    Flowsheet Row Most Recent Value   AUA SYMPTOM SCORE    How often have you had a sensation of not emptying your bladder completely after you finished urinating? 4 (P)     How often have you had to urinate again less than two hours after you finished urinating? 4 (P)     How often have you found you stopped and started again several times when you urinate? 4 (P)     How often have you found it difficult to postpone urination? 4 (P)     How often have you had a weak urinary stream? 4 (P)     How often have you had to push or strain to begin urination? 4 (P)     How many times did you most typically get up to urinate from the time you went to bed at night until the time you got up in the morning? 4 (P)     Quality of Life: If you were to spend the rest of your life with your urinary condition just the way it is now, how would you feel about that? 5 (P)     AUA SYMPTOM SCORE 28 (P)             Review of Systems   Constitutional: Negative for chills and fever  Respiratory: Negative for shortness of breath  Cardiovascular: Negative for chest pain  Gastrointestinal: Negative for abdominal pain  Genitourinary: Positive for difficulty urinating, frequency and urgency  Negative for dysuria, flank pain and hematuria  Neurological: Negative for dizziness         Past Medical History  Past Medical History:   Diagnosis Date    Asthma     Difficulty attaining erection     Fear of needles     pt will pass out in the presents of needles    HTN (hypertension)     Kidney disease     Low HDL (under 40) Last assessed 02/08/17    Wheezing        Past Social History  Past Surgical History:   Procedure Laterality Date    ROOT CANAL       Social History     Tobacco Use   Smoking Status Never Smoker   Smokeless Tobacco Never Used       Past Family History  Family History   Problem Relation Age of Onset    Hypertension Mother     Multiple sclerosis Mother     Tremor Mother     Gout Father     Multiple sclerosis Brother     Tremor Brother     Asthma Family     Other Family         CVA    Cancer Family     Multiple sclerosis Paternal Uncle     Tremor Paternal Uncle        Past Social history  Social History     Socioeconomic History    Marital status: Single     Spouse name: Not on file    Number of children: Not on file    Years of education: Not on file    Highest education level: Not on file   Occupational History    Not on file   Tobacco Use    Smoking status: Never Smoker    Smokeless tobacco: Never Used   Substance and Sexual Activity    Alcohol use: No    Drug use: No    Sexual activity: Not on file   Other Topics Concern    Not on file   Social History Narrative    Always uses seatbelt     Social Determinants of Health     Financial Resource Strain: Not on file   Food Insecurity: Not on file   Transportation Needs: Not on file   Physical Activity: Not on file   Stress: Not on file   Social Connections: Not on file   Intimate Partner Violence: Not on file   Housing Stability: Not on file       Current Medications  Current Outpatient Medications   Medication Sig Dispense Refill    acyclovir (ZOVIRAX) 5 % ointment Every 6 hours as needed 15 g 1    albuterol (Ventolin HFA) 90 mcg/act inhaler INHALE 1-2 PUFFS EVERY 4 HOURS AS NEEDED FOR COUGH FOR SHORTNESS OF BREATH OR WHEEZING 54 g 2    Cholecalciferol (VITAMIN D3) 2000 units capsule Take 1 capsule by mouth daily      LORazepam (ATIVAN) 1 mg tablet Take 1 tab po 30 minutes prior to lab work 1 tablet 0    Melatonin 5 MG CAPS Take 5 mg by mouth daily at bedtime as needed      meloxicam (Mobic) 15 mg tablet Take 1 tablet (15 mg total) by mouth daily 30 tablet 0    sildenafil (VIAGRA) 100 mg tablet Take 1 tablet (100 mg total) by mouth daily as needed for erectile dysfunction 10 tablet 0    vitamin E, tocopherol, 400 units capsule Take 1 capsule (400 Units total) by mouth daily 100 capsule 2     No current facility-administered medications for this visit  Allergies  Allergies   Allergen Reactions    Dust Mite Extract     Molds & Smuts     Penicillins          The following portions of the patient's history were reviewed and updated as appropriate: allergies, current medications, past medical history, past social history, past surgical history and problem list       Vitals  Vitals:    08/12/22 1322   BP: 130/94   Pulse: 61   SpO2: 98%   Weight: 110 kg (243 lb)   Height: 5' 11" (1 803 m)           Physical Exam  Physical Exam  Constitutional:       Appearance: Normal appearance  HENT:      Head: Normocephalic and atraumatic  Right Ear: External ear normal       Left Ear: External ear normal    Eyes:      General: No scleral icterus  Conjunctiva/sclera: Conjunctivae normal    Cardiovascular:      Pulses: Normal pulses  Pulmonary:      Effort: Pulmonary effort is normal    Genitourinary:     Comments: Some pubic ingrown hairs noted without evidence of infection or abscess  Small penile angiokeratomas noted along the glans penile rim  Prostate approximately 30 g without nodules or tenderness  Musculoskeletal:         General: Normal range of motion  Cervical back: Normal range of motion  Skin:     General: Skin is warm and dry  Neurological:      General: No focal deficit present  Mental Status: He is alert and oriented to person, place, and time  Psychiatric:         Mood and Affect: Mood normal          Behavior: Behavior normal          Thought Content:  Thought content normal          Judgment: Judgment normal  Results  Recent Results (from the past 1 hour(s))   POCT Measure PVR    Collection Time: 08/12/22  1:37 PM   Result Value Ref Range    POST-VOID RESIDUAL VOLUME, ML POC 6 mL   ]  No results found for: PSA  Lab Results   Component Value Date    CALCIUM 9 9 12/05/2020    K 3 7 12/05/2020    CO2 27 12/05/2020     12/05/2020    BUN 16 12/05/2020    CREATININE 1 26 12/05/2020     Lab Results   Component Value Date    WBC 6 33 12/05/2020    HGB 16 8 12/05/2020    HCT 50 1 (H) 12/05/2020    MCV 91 12/05/2020     12/05/2020           Orders  Orders Placed This Encounter   Procedures    POCT Measure PVR       Jayden Valenzuela PA-C

## 2022-08-12 ENCOUNTER — OFFICE VISIT (OUTPATIENT)
Dept: UROLOGY | Facility: CLINIC | Age: 42
End: 2022-08-12
Payer: COMMERCIAL

## 2022-08-12 VITALS
OXYGEN SATURATION: 98 % | SYSTOLIC BLOOD PRESSURE: 130 MMHG | BODY MASS INDEX: 34.02 KG/M2 | WEIGHT: 243 LBS | HEIGHT: 71 IN | DIASTOLIC BLOOD PRESSURE: 94 MMHG | HEART RATE: 61 BPM

## 2022-08-12 DIAGNOSIS — R39.12 WEAK URINARY STREAM: Primary | ICD-10-CM

## 2022-08-12 DIAGNOSIS — N52.9 ERECTILE DYSFUNCTION, UNSPECIFIED ERECTILE DYSFUNCTION TYPE: ICD-10-CM

## 2022-08-12 DIAGNOSIS — R35.0 URINARY FREQUENCY: ICD-10-CM

## 2022-08-12 DIAGNOSIS — Z12.5 PROSTATE CANCER SCREENING: ICD-10-CM

## 2022-08-12 DIAGNOSIS — F41.9 ANXIETY: ICD-10-CM

## 2022-08-12 LAB — POST-VOID RESIDUAL VOLUME, ML POC: 6 ML

## 2022-08-12 PROCEDURE — 99214 OFFICE O/P EST MOD 30 MIN: CPT | Performed by: PHYSICIAN ASSISTANT

## 2022-08-12 PROCEDURE — 51798 US URINE CAPACITY MEASURE: CPT | Performed by: PHYSICIAN ASSISTANT

## 2022-08-12 RX ORDER — TAMSULOSIN HYDROCHLORIDE 0.4 MG/1
0.4 CAPSULE ORAL
Qty: 30 CAPSULE | Refills: 2 | Status: SHIPPED | OUTPATIENT
Start: 2022-08-12 | End: 2022-09-06

## 2022-09-03 DIAGNOSIS — R39.12 WEAK URINARY STREAM: ICD-10-CM

## 2022-09-05 DIAGNOSIS — M54.50 ACUTE BILATERAL LOW BACK PAIN WITHOUT SCIATICA: ICD-10-CM

## 2022-09-05 DIAGNOSIS — F52.21 ERECTILE DYSFUNCTION OF NON-ORGANIC ORIGIN: ICD-10-CM

## 2022-09-05 RX ORDER — SILDENAFIL 100 MG/1
100 TABLET, FILM COATED ORAL DAILY PRN
Qty: 10 TABLET | Refills: 0 | Status: SHIPPED | OUTPATIENT
Start: 2022-09-05 | End: 2022-09-07 | Stop reason: SDUPTHER

## 2022-09-06 RX ORDER — TAMSULOSIN HYDROCHLORIDE 0.4 MG/1
CAPSULE ORAL
Qty: 90 CAPSULE | Refills: 1 | Status: SHIPPED | OUTPATIENT
Start: 2022-09-06

## 2022-09-07 DIAGNOSIS — F52.21 ERECTILE DYSFUNCTION OF NON-ORGANIC ORIGIN: ICD-10-CM

## 2022-09-07 RX ORDER — MELOXICAM 15 MG/1
TABLET ORAL
Qty: 30 TABLET | Refills: 0 | Status: SHIPPED | OUTPATIENT
Start: 2022-09-07

## 2022-09-07 RX ORDER — SILDENAFIL 100 MG/1
100 TABLET, FILM COATED ORAL DAILY PRN
Qty: 10 TABLET | Refills: 11 | Status: SHIPPED | OUTPATIENT
Start: 2022-09-07

## 2022-09-22 ENCOUNTER — TELEPHONE (OUTPATIENT)
Dept: INTERNAL MEDICINE CLINIC | Facility: CLINIC | Age: 42
End: 2022-09-22

## 2022-09-22 ENCOUNTER — APPOINTMENT (OUTPATIENT)
Dept: LAB | Facility: HOSPITAL | Age: 42
End: 2022-09-22
Payer: COMMERCIAL

## 2022-09-22 DIAGNOSIS — R73.01 ELEVATED FASTING GLUCOSE: Primary | ICD-10-CM

## 2022-09-22 DIAGNOSIS — F41.9 ANXIETY: ICD-10-CM

## 2022-09-22 DIAGNOSIS — R73.01 ELEVATED FASTING GLUCOSE: ICD-10-CM

## 2022-09-22 DIAGNOSIS — N52.9 ERECTILE DYSFUNCTION, UNSPECIFIED ERECTILE DYSFUNCTION TYPE: ICD-10-CM

## 2022-09-22 DIAGNOSIS — Z99.89 OSA ON CPAP: ICD-10-CM

## 2022-09-22 DIAGNOSIS — Z12.5 PROSTATE CANCER SCREENING: ICD-10-CM

## 2022-09-22 DIAGNOSIS — G47.33 OSA ON CPAP: ICD-10-CM

## 2022-09-22 LAB
ALBUMIN SERPL BCP-MCNC: 4.3 G/DL (ref 3.5–5)
ALP SERPL-CCNC: 54 U/L (ref 46–116)
ALT SERPL W P-5'-P-CCNC: 36 U/L (ref 12–78)
ANION GAP SERPL CALCULATED.3IONS-SCNC: 4 MMOL/L (ref 4–13)
AST SERPL W P-5'-P-CCNC: 16 U/L (ref 5–45)
BASOPHILS # BLD AUTO: 0.04 THOUSANDS/ÂΜL (ref 0–0.1)
BASOPHILS NFR BLD AUTO: 0 % (ref 0–1)
BILIRUB SERPL-MCNC: 1.1 MG/DL (ref 0.2–1)
BUN SERPL-MCNC: 16 MG/DL (ref 5–25)
CALCIUM SERPL-MCNC: 9.8 MG/DL (ref 8.3–10.1)
CHLORIDE SERPL-SCNC: 106 MMOL/L (ref 96–108)
CO2 SERPL-SCNC: 28 MMOL/L (ref 21–32)
CREAT SERPL-MCNC: 1.16 MG/DL (ref 0.6–1.3)
EOSINOPHIL # BLD AUTO: 0.08 THOUSAND/ÂΜL (ref 0–0.61)
EOSINOPHIL NFR BLD AUTO: 1 % (ref 0–6)
ERYTHROCYTE [DISTWIDTH] IN BLOOD BY AUTOMATED COUNT: 12.6 % (ref 11.6–15.1)
GFR SERPL CREATININE-BSD FRML MDRD: 77 ML/MIN/1.73SQ M
GLUCOSE P FAST SERPL-MCNC: 105 MG/DL (ref 65–99)
HCT VFR BLD AUTO: 48.3 % (ref 36.5–49.3)
HGB BLD-MCNC: 16.6 G/DL (ref 12–17)
IMM GRANULOCYTES # BLD AUTO: 0.05 THOUSAND/UL (ref 0–0.2)
IMM GRANULOCYTES NFR BLD AUTO: 0 % (ref 0–2)
LYMPHOCYTES # BLD AUTO: 1.41 THOUSANDS/ÂΜL (ref 0.6–4.47)
LYMPHOCYTES NFR BLD AUTO: 11 % (ref 14–44)
MCH RBC QN AUTO: 31.1 PG (ref 26.8–34.3)
MCHC RBC AUTO-ENTMCNC: 34.4 G/DL (ref 31.4–37.4)
MCV RBC AUTO: 91 FL (ref 82–98)
MONOCYTES # BLD AUTO: 0.7 THOUSAND/ÂΜL (ref 0.17–1.22)
MONOCYTES NFR BLD AUTO: 5 % (ref 4–12)
NEUTROPHILS # BLD AUTO: 10.67 THOUSANDS/ÂΜL (ref 1.85–7.62)
NEUTS SEG NFR BLD AUTO: 83 % (ref 43–75)
NRBC BLD AUTO-RTO: 0 /100 WBCS
PLATELET # BLD AUTO: 212 THOUSANDS/UL (ref 149–390)
PMV BLD AUTO: 9.1 FL (ref 8.9–12.7)
POTASSIUM SERPL-SCNC: 4.1 MMOL/L (ref 3.5–5.3)
PROT SERPL-MCNC: 8.3 G/DL (ref 6.4–8.4)
PSA SERPL-MCNC: 0.5 NG/ML (ref 0–4)
RBC # BLD AUTO: 5.33 MILLION/UL (ref 3.88–5.62)
SODIUM SERPL-SCNC: 138 MMOL/L (ref 135–147)
TSH SERPL DL<=0.05 MIU/L-ACNC: 1.25 UIU/ML (ref 0.45–4.5)
WBC # BLD AUTO: 12.95 THOUSAND/UL (ref 4.31–10.16)

## 2022-09-22 PROCEDURE — 36415 COLL VENOUS BLD VENIPUNCTURE: CPT

## 2022-09-22 PROCEDURE — G0103 PSA SCREENING: HCPCS

## 2022-09-22 PROCEDURE — 84402 ASSAY OF FREE TESTOSTERONE: CPT

## 2022-09-22 PROCEDURE — 84403 ASSAY OF TOTAL TESTOSTERONE: CPT

## 2022-09-22 PROCEDURE — 83036 HEMOGLOBIN GLYCOSYLATED A1C: CPT

## 2022-09-22 PROCEDURE — 80053 COMPREHEN METABOLIC PANEL: CPT

## 2022-09-22 PROCEDURE — 84443 ASSAY THYROID STIM HORMONE: CPT

## 2022-09-22 PROCEDURE — 85025 COMPLETE CBC W/AUTO DIFF WBC: CPT

## 2022-09-22 NOTE — TELEPHONE ENCOUNTER
Patient had blood work done today but did not have an A1C ordered  Patient would like to know if you could please order that while they still have the blood available so he doesn't have to get stuck again? Patient said that he went to the  doctor and they said that he is starting with cataracts and this is another reason he is concerned for diabetes  Please advise    Kodi Gallardo

## 2022-09-23 LAB
TESTOST FREE SERPL-MCNC: 8.6 PG/ML (ref 6.8–21.5)
TESTOST SERPL-MCNC: 528 NG/DL (ref 264–916)

## 2022-09-23 NOTE — TELEPHONE ENCOUNTER
Patient called to see if test was added  Informed him it was and spoke to [facility], He was all set and would not have to get stuck again

## 2022-09-24 LAB
EST. AVERAGE GLUCOSE BLD GHB EST-MCNC: 105 MG/DL
HBA1C MFR BLD: 5.3 %

## 2022-09-26 DIAGNOSIS — D72.829 LEUKOCYTOSIS, UNSPECIFIED TYPE: Primary | ICD-10-CM

## 2022-09-28 ENCOUNTER — OFFICE VISIT (OUTPATIENT)
Dept: INTERNAL MEDICINE CLINIC | Facility: CLINIC | Age: 42
End: 2022-09-28
Payer: COMMERCIAL

## 2022-09-28 VITALS
BODY MASS INDEX: 32.76 KG/M2 | SYSTOLIC BLOOD PRESSURE: 118 MMHG | HEIGHT: 71 IN | WEIGHT: 234 LBS | RESPIRATION RATE: 14 BRPM | HEART RATE: 103 BPM | DIASTOLIC BLOOD PRESSURE: 80 MMHG | OXYGEN SATURATION: 97 %

## 2022-09-28 DIAGNOSIS — F41.9 ANXIETY: ICD-10-CM

## 2022-09-28 DIAGNOSIS — J45.909 MODERATE ASTHMA WITHOUT COMPLICATION, UNSPECIFIED WHETHER PERSISTENT: ICD-10-CM

## 2022-09-28 DIAGNOSIS — F52.21 ERECTILE DYSFUNCTION OF NON-ORGANIC ORIGIN: Primary | ICD-10-CM

## 2022-09-28 PROCEDURE — 99213 OFFICE O/P EST LOW 20 MIN: CPT | Performed by: NURSE PRACTITIONER

## 2022-09-28 RX ORDER — ALBUTEROL SULFATE 90 UG/1
AEROSOL, METERED RESPIRATORY (INHALATION)
Qty: 54 G | Refills: 5 | Status: SHIPPED | OUTPATIENT
Start: 2022-09-28

## 2022-09-28 RX ORDER — ESCITALOPRAM OXALATE 5 MG/1
5 TABLET ORAL DAILY
Qty: 30 TABLET | Refills: 0 | Status: SHIPPED | OUTPATIENT
Start: 2022-09-28 | End: 2022-10-20

## 2022-09-28 RX ORDER — TADALAFIL 20 MG/1
20 TABLET ORAL DAILY PRN
Qty: 10 TABLET | Refills: 11 | Status: SHIPPED | OUTPATIENT
Start: 2022-09-28

## 2022-09-28 NOTE — PROGRESS NOTES
Assessment/Plan:    Anxiety- Start Escitalopram  OK to take 1/2 tablet x 1 week then increase to a full tablet thereafter  Referred to behavioral health  Follow up in one month  Diagnoses and all orders for this visit:    Erectile dysfunction of non-organic origin  -     tadalafil (CIALIS) 20 MG tablet; Take 1 tablet (20 mg total) by mouth daily as needed for erectile dysfunction    Moderate asthma without complication, unspecified whether persistent  -     albuterol (Ventolin HFA) 90 mcg/act inhaler; INHALE 1-2 PUFFS EVERY 4 HOURS AS NEEDED FOR COUGH FOR SHORTNESS OF BREATH OR WHEEZING    Anxiety  -     escitalopram (LEXAPRO) 5 mg tablet; Take 1 tablet (5 mg total) by mouth daily  -     Ambulatory Referral to [facility]; Future    The patient was counseled regarding instructions for management, risk factor reductions, patient and family education,impressions, risks and benefits of treatment options, side effects of medications, importance of compliance with treatment  The treatment plan was reviewed with the patient/guardian and patient/guardian understands and agrees with the treatment plan          Current Outpatient Medications:     acyclovir (ZOVIRAX) 5 % ointment, Every 6 hours as needed, Disp: 15 g, Rfl: 1    albuterol (Ventolin HFA) 90 mcg/act inhaler, INHALE 1-2 PUFFS EVERY 4 HOURS AS NEEDED FOR COUGH FOR SHORTNESS OF BREATH OR WHEEZING, Disp: 54 g, Rfl: 5    Cholecalciferol (VITAMIN D3) 2000 units capsule, Take 1 capsule by mouth daily, Disp: , Rfl:     escitalopram (LEXAPRO) 5 mg tablet, Take 1 tablet (5 mg total) by mouth daily, Disp: 30 tablet, Rfl: 0    LORazepam (ATIVAN) 1 mg tablet, Take 1 tab po 30 minutes prior to lab work, Disp: 1 tablet, Rfl: 0    Melatonin 5 MG CAPS, Take 5 mg by mouth daily at bedtime as needed, Disp: , Rfl:     meloxicam (MOBIC) 15 mg tablet, TAKE 1 TABLET BY MOUTH EVERY DAY WITH FOOD FOR PAIN, Disp: 30 tablet, Rfl: 0    sildenafil (VIAGRA) 100 mg tablet, Take 1 tablet (100 mg total) by mouth daily as needed for erectile dysfunction, Disp: 10 tablet, Rfl: 11    tadalafil (CIALIS) 20 MG tablet, Take 1 tablet (20 mg total) by mouth daily as needed for erectile dysfunction, Disp: 10 tablet, Rfl: 11    tamsulosin (FLOMAX) 0 4 mg, TAKE 1 CAPSULE BY MOUTH EVERY DAY WITH DINNER, Disp: 90 capsule, Rfl: 1    vitamin E, tocopherol, 400 units capsule, Take 1 capsule (400 Units total) by mouth daily, Disp: 100 capsule, Rfl: 2    Subjective:      Patient ID: León Landry is a 39 y o  male  Here for lab review and discuss anxiety meds  The following portions of the patient's history were reviewed and updated as appropriate:   He has a past medical history of Asthma, Difficulty attaining erection, Fear of needles, HTN (hypertension), Kidney disease, Low HDL (under 40), and Wheezing ,  does not have any pertinent problems on file  ,   has a past surgical history that includes Root canal ,  family history includes Asthma in his family; Cancer in his family; Gout in his father; Hypertension in his mother; Multiple sclerosis in his brother, mother, and paternal uncle; Other in his family; Tremor in his brother, mother, and paternal uncle ,   reports that he has never smoked  He has never used smokeless tobacco  He reports that he does not drink alcohol and does not use drugs  ,  is allergic to dust mite extract, molds & smuts, and penicillins       Review of Systems   Constitutional: Negative  Respiratory: Negative  Cardiovascular: Negative  Musculoskeletal: Negative  Psychiatric/Behavioral: The patient is nervous/anxious          Objective:  /80 (BP Location: Left arm, Patient Position: Sitting)   Pulse 103   Resp 14   Ht 5' 11" (1 803 m)   Wt 106 kg (234 lb)   SpO2 97%   BMI 32 64 kg/m²     Lab Review  Appointment on 09/22/2022   Component Date Value    WBC 09/22/2022 12 95 (A)    RBC 09/22/2022 5 33     Hemoglobin 09/22/2022 16 6     Hematocrit 09/22/2022 48 3     MCV 09/22/2022 91     MCH 09/22/2022 31 1     MCHC 09/22/2022 34 4     RDW 09/22/2022 12 6     MPV 09/22/2022 9 1     Platelets 32/44/3014 212     nRBC 09/22/2022 0     Neutrophils Relative 09/22/2022 83 (A)    Immat GRANS % 09/22/2022 0     Lymphocytes Relative 09/22/2022 11 (A)    Monocytes Relative 09/22/2022 5     Eosinophils Relative 09/22/2022 1     Basophils Relative 09/22/2022 0     Neutrophils Absolute 09/22/2022 10 67 (A)    Immature Grans Absolute 09/22/2022 0 05     Lymphocytes Absolute 09/22/2022 1 41     Monocytes Absolute 09/22/2022 0 70     Eosinophils Absolute 09/22/2022 0 08     Basophils Absolute 09/22/2022 0 04     Sodium 09/22/2022 138     Potassium 09/22/2022 4 1     Chloride 09/22/2022 106     CO2 09/22/2022 28     ANION GAP 09/22/2022 4     BUN 09/22/2022 16     Creatinine 09/22/2022 1 16     Glucose, Fasting 09/22/2022 105 (A)    Calcium 09/22/2022 9 8     AST 09/22/2022 16     ALT 09/22/2022 36     Alkaline Phosphatase 09/22/2022 54     Total Protein 09/22/2022 8 3     Albumin 09/22/2022 4 3     Total Bilirubin 09/22/2022 1 10 (A)    eGFR 09/22/2022 77     TSH 3RD GENERATON 09/22/2022 1 250     Testosterone, Free 09/22/2022 8 6     TESTOSTERONE TOTAL 09/22/2022 528     PSA 09/22/2022 0 5     Hemoglobin A1C 09/22/2022 5 3     EAG 09/22/2022 105    Office Visit on 08/12/2022   Component Date Value    POST-VOID RESIDUAL VOLUM* 08/12/2022 6       Imaging: No results found  Physical Exam  Constitutional:       Appearance: He is well-developed  Cardiovascular:      Rate and Rhythm: Normal rate and regular rhythm  Heart sounds: Normal heart sounds  Pulmonary:      Effort: Pulmonary effort is normal       Breath sounds: Normal breath sounds  Musculoskeletal:         General: Normal range of motion  Neurological:      Mental Status: He is alert and oriented to person, place, and time        Deep Tendon Reflexes: Reflexes are normal and symmetric  Psychiatric:         Behavior: Behavior normal          Thought Content:  Thought content normal          Judgment: Judgment normal

## 2022-09-28 NOTE — PATIENT INSTRUCTIONS
Anxiety- Start Escitalopram  OK to take 1/2 tablet x 1 week then increase to a full tablet thereafter  Referred to behavioral health  Follow up in one month

## 2022-10-20 DIAGNOSIS — F41.9 ANXIETY: ICD-10-CM

## 2022-10-24 RX ORDER — ESCITALOPRAM OXALATE 5 MG/1
5 TABLET ORAL DAILY
Qty: 90 TABLET | Refills: 1 | Status: SHIPPED | OUTPATIENT
Start: 2022-10-24 | End: 2022-11-02 | Stop reason: DRUGHIGH

## 2022-10-26 ENCOUNTER — SOCIAL WORK (OUTPATIENT)
Dept: BEHAVIORAL/MENTAL HEALTH CLINIC | Facility: CLINIC | Age: 42
End: 2022-10-26
Payer: COMMERCIAL

## 2022-10-26 DIAGNOSIS — F41.9 ANXIETY: Primary | ICD-10-CM

## 2022-10-26 DIAGNOSIS — F32.9 REACTIVE DEPRESSION: ICD-10-CM

## 2022-10-26 PROCEDURE — 90791 PSYCH DIAGNOSTIC EVALUATION: CPT | Performed by: SOCIAL WORKER

## 2022-10-26 NOTE — PSYCH
Visit Time    Visit Start Time: 3:55PM  Visit Stop Time: 4:41  Total Visit Duration: 46 minutes    Assessment/Plan: CRISTO states that he is going through a tough break up  He no longer trusts her  He recently found out they she has been lying to him  She told him that she was talking to someone at work, in May and she felt guilty  She has been lying about where she has been going  Ongoing depression and anxiety  We processed the relationship at length  Sex has been an issue for them and they he does have ED which has gotten worse  There are no diagnoses linked to this encounter  Subjective: He started on Lexapro recently and was recommended to talk to a therapist        Patient ID: Titus Rocha is a 39 y o  male  HPI:  Fatty liver disease, asthma, low HDL, obesity, lumbar radiculitis  Pre-morbid level of function and History of Present Illness: His depression and anxiety worsened in the last year and a half,  Previous Psychiatric/psychological treatment/year: Was in therapy last year for 3-4 sessions  She wanted him to leave the relationship and the cost of the sessions  Current Psychiatrist/Therapist: None  Outpatient and/or Partial and Other [employer] Used (CTT, ICM, VNA): Integration      Problem Assessment:     SOCIAL/VOCATION:  Family Constellation (include parents, relationship with each and pertinent Psych/Medical History):     Family History   Problem Relation Age of Onset   • Hypertension Mother    • Multiple sclerosis Mother    • Tremor Mother    • Gout Father    • Multiple sclerosis Brother    • Tremor Brother    • Multiple sclerosis Paternal Uncle    • Tremor Paternal Uncle    • Asthma Family    • Other Family         CVA   • Cancer Family        Mother: They get along, and she is a strong personality  Spouse: Taking a break from the relationship as he cannot trust her as she has lied to him    Father: Dad and mom have been  since 26 but he has a good relationship with him  Children: None  Sibling: Brother- Chilo Diaz is a heroin addict  Younger 27 yo  Sibling: Brother- Jose Tian, they get along well enough, but not much of a relationship he was born in 18  Sibling: Step brother Krystina Bedoya is 27 and the age difference has them not being very close   Sibling: Brother- Kelsie Lynn is also born in 18 and he is described as a dickhead who has caused a lot of problems in the family  Janice Echols relates best to my friends  he lives with his fiancee  he does not live alone  Domestic Violence: No past history of domestic violence, There is no history of child abuse and Denies any and all abuse    Additional Comments related to family/relationships/peer support:  He does feel he kind of has a good support system in friends and family  Most friends have families of their own        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  No    FUNCTIONAL STATUS: There has been a recent change in Janice Echols ability to do the following: None    Level of Assistance Needed/By Whom?: N/A    Janice Echols learns best by  [employer] on utube    SUBSTANCE ABUSE ASSESSMENT: no substance abuse    Substance/Route/Age/Amount/Frequency/Last Use: Smokes weed occasionally and no alcohol use    DETOX HISTORY: None    Previous detox/rehab treatment: None    HEALTH ASSESSMENT: no referral to PCP needed    LEGAL: No Mental Health Advance Directive or Power of  on file    Prenatal History: N/A    Delivery History: N/A    Developmental Milestones: N/A  Temperament as an infant was N/A  Temperament as a toddler was N/A  Temperament at school age was N/A  Temperament as a teenager was N/A  Risk Assessment:   The following ratings are based on my interview(s) with CRISTO    Risk of Harm to Self:   Demographic risk factors include   Historical Risk Factors include Paternal uncle  by suicide-he did not know this uncle  Recent Specific Risk Factors include diagnosis of depression  and recent breakup  Additional Factors for a Child or Adolescent Adult    Risk of Harm to Others:   Demographic Risk Factors include male  Historical Risk Factors include None  Recent Specific Risk Factors include multiple stressors    Access to Weapons:   Urmila Pham has access to the following weapons: Gun  The following steps have been taken to ensure weapons are properly secured:in a  Lock box    Based on the above information, the client presents the following risk of harm to self or others:  low    The following interventions are recommended:   no intervention changes    Notes regarding this Risk Assessment: CRISTO is hyper focused on his estranged fiancee who is moving out this weekend  They have never shared a bedroom            Review Of Systems:     Mood Normal   Behavior Normal    Thought Content Normal   General Normal    Personality Normal   Other Psych Symptoms Normal   Constitutional Normal   ENT Normal   Cardiovascular Normal    Respiratory Normal    Gastrointestinal Normal   Genitourinary Normal    Musculoskeletal Negative   Integumentary Normal    Neurological Normal    Endocrine Normal          Mental status:  Appearance calm and cooperative , adequate hygiene and grooming and good eye contact    Mood mood appropriate   Affect affect appropriate    Speech a normal rate and fluent   Thought Processes coherent/organized and normal thought processes   Hallucinations no hallucinations present    Thought Content no delusions   Abnormal Thoughts no suicidal thoughts  and no homicidal thoughts    Orientation  oriented to person, place and time, oriented to person, oriented to place and oriented to time   Remote Memory short term memory intact and long term memory intact   Attention Span concentration intact   Intellect Appears to be of Average Intelligence   Fund of Knowledge displays adequate knowledge of current events, adequate fund of knowledge regarding past history and adequate fund of knowledge regarding vocabulary    Insight Insight intact   Judgement judgment was intact   Muscle Strength Muscle strength and tone were normal and Normal gait    Language no difficulty naming common objects, no difficulty repeating a phrase  and no difficulty writing a sentence    Pain none   Pain Scale 0

## 2022-10-31 ENCOUNTER — OFFICE VISIT (OUTPATIENT)
Dept: UROLOGY | Facility: CLINIC | Age: 42
End: 2022-10-31

## 2022-10-31 VITALS
BODY MASS INDEX: 34.72 KG/M2 | OXYGEN SATURATION: 97 % | WEIGHT: 248 LBS | DIASTOLIC BLOOD PRESSURE: 80 MMHG | HEIGHT: 71 IN | SYSTOLIC BLOOD PRESSURE: 116 MMHG | HEART RATE: 78 BPM

## 2022-10-31 DIAGNOSIS — R35.0 URINARY FREQUENCY: Primary | ICD-10-CM

## 2022-10-31 LAB
POST-VOID RESIDUAL VOLUME, ML POC: 6 ML
SL AMB  POCT GLUCOSE, UA: NORMAL
SL AMB LEUKOCYTE ESTERASE,UA: NORMAL
SL AMB POCT BILIRUBIN,UA: NORMAL
SL AMB POCT BLOOD,UA: NORMAL
SL AMB POCT CLARITY,UA: CLEAR
SL AMB POCT COLOR,UA: NORMAL
SL AMB POCT KETONES,UA: NORMAL
SL AMB POCT NITRITE,UA: NORMAL
SL AMB POCT PH,UA: 5
SL AMB POCT SPECIFIC GRAVITY,UA: 1.03
SL AMB POCT URINE PROTEIN: NORMAL
SL AMB POCT UROBILINOGEN: 0.02

## 2022-10-31 NOTE — PROGRESS NOTES
10/31/2022      Chief Complaint   Patient presents with   • Follow-up     Assessment and Plan    1  ED  - Currently improved  - Testosterone level normal at 528    2  LUTS  - Combination of emptying and storage symptoms  - Improved with Flomax  - Recent PSA normal at 0 5  LUDMILA normal at last visit  - can follow-up in 1 year for symptom reassessment and prescription refill  History of Present Illness  Titus Rocha is a 39 y o  male here for follow up evaluation of erectile dysfunction and lower urinary tract symptoms  He has erectile dysfunction which is felt to be due to psychogenic etiology  He reported adequate erections with masturbation as well as morning erections  He reported relationship issues with his partner as well as anxiety associated with intercourse  He was provided with referral to couple/sex therapy at last visit  He states him and his partner now going through a separation  He is currently not interested in any treatment at this time and states his issues have improved  He also complained of urinary urgency, frequency, weak urinary stream, and nocturia last visit  He was prescribed tamsulosin for this  He denies any prior  surgical manipulation nor any family history of  malignancy  He states the Flomax has been beneficial   Denies any bothersome side effects  Review of Systems   Constitutional: Negative for chills and fever  Respiratory: Negative for shortness of breath  Cardiovascular: Negative for chest pain  Gastrointestinal: Negative for abdominal pain  Genitourinary: Negative for difficulty urinating, dysuria, flank pain, frequency, hematuria and urgency  Neurological: Negative for dizziness             AUA SYMPTOM SCORE    Flowsheet Row Most Recent Value   AUA SYMPTOM SCORE    How often have you had a sensation of not emptying your bladder completely after you finished urinating? 2 (P)     How often have you had to urinate again less than two hours after you finished urinating? 2 (P)     How often have you found you stopped and started again several times when you urinate? 2 (P)     How often have you found it difficult to postpone urination? 1 (P)     How often have you had a weak urinary stream? 1 (P)     How often have you had to push or strain to begin urination? 1 (P)     How many times did you most typically get up to urinate from the time you went to bed at night until the time you got up in the morning? 1 (P)     Quality of Life: If you were to spend the rest of your life with your urinary condition just the way it is now, how would you feel about that? 2 (P)     AUA SYMPTOM SCORE 10 (P)              Past Medical History  Past Medical History:   Diagnosis Date   • Asthma    • Difficulty attaining erection    • Erectile dysfunction    • Fear of needles     pt will pass out in the presents of needles   • HTN (hypertension)    • Hypertension 2011   • Kidney disease    • Low HDL (under 40)     Last assessed 02/08/17   • Wheezing        Past Social History  Past Surgical History:   Procedure Laterality Date   • ROOT CANAL       Social History     Tobacco Use   Smoking Status Never Smoker   Smokeless Tobacco Never Used       Past Family History  Family History   Problem Relation Age of Onset   • Hypertension Mother    • Multiple sclerosis Mother    • Tremor Mother    • Gout Father    • Multiple sclerosis Brother    • Tremor Brother    • Multiple sclerosis Paternal Uncle    • Tremor Paternal Uncle    • Asthma Family    • Other Family         CVA   • Cancer Family        Past Social history  Social History     Socioeconomic History   • Marital status: Single     Spouse name: Not on file   • Number of children: Not on file   • Years of education: Not on file   • Highest education level: Not on file   Occupational History   • Not on file   Tobacco Use   • Smoking status: Never Smoker   • Smokeless tobacco: Never Used   Vaping Use   • Vaping Use: Never used   Substance and Sexual Activity   • Alcohol use: No   • Drug use: No   • Sexual activity: Yes     Partners: Female   Other Topics Concern   • Not on file   Social History Narrative    Always uses seatbelt     Social Determinants of Health     Financial Resource Strain: Not on file   Food Insecurity: Not on file   Transportation Needs: Not on file   Physical Activity: Not on file   Stress: Not on file   Social Connections: Not on file   Intimate Partner Violence: Not on file   Housing Stability: Not on file       Current Medications  Current Outpatient Medications   Medication Sig Dispense Refill   • acyclovir (ZOVIRAX) 5 % ointment Every 6 hours as needed 15 g 1   • albuterol (Ventolin HFA) 90 mcg/act inhaler INHALE 1-2 PUFFS EVERY 4 HOURS AS NEEDED FOR COUGH FOR SHORTNESS OF BREATH OR WHEEZING 54 g 5   • Cholecalciferol (VITAMIN D3) 2000 units capsule Take 1 capsule by mouth daily     • escitalopram (LEXAPRO) 5 mg tablet TAKE 1 TABLET (5 MG TOTAL) BY MOUTH DAILY  90 tablet 1   • LORazepam (ATIVAN) 1 mg tablet Take 1 tab po 30 minutes prior to lab work 1 tablet 0   • Melatonin 5 MG CAPS Take 5 mg by mouth daily at bedtime as needed     • meloxicam (MOBIC) 15 mg tablet TAKE 1 TABLET BY MOUTH EVERY DAY WITH FOOD FOR PAIN 30 tablet 0   • sildenafil (VIAGRA) 100 mg tablet Take 1 tablet (100 mg total) by mouth daily as needed for erectile dysfunction 10 tablet 11   • tadalafil (CIALIS) 20 MG tablet Take 1 tablet (20 mg total) by mouth daily as needed for erectile dysfunction 10 tablet 11   • tamsulosin (FLOMAX) 0 4 mg TAKE 1 CAPSULE BY MOUTH EVERY DAY WITH DINNER 90 capsule 1   • vitamin E, tocopherol, 400 units capsule Take 1 capsule (400 Units total) by mouth daily 100 capsule 2     No current facility-administered medications for this visit         Allergies  Allergies   Allergen Reactions   • Dust Mite Extract    • Molds & Smuts    • Penicillins          The following portions of the patient's history were reviewed and updated as appropriate: allergies, current medications, past medical history, past social history, past surgical history and problem list       Vitals  Vitals:    10/31/22 0730   BP: 116/80   BP Location: Left arm   Patient Position: Sitting   Cuff Size: Adult   Pulse: 78   SpO2: 97%   Weight: 112 kg (248 lb)   Height: 5' 11" (1 803 m)           Physical Exam  Physical Exam  Constitutional:       Appearance: Normal appearance  HENT:      Head: Normocephalic and atraumatic  Right Ear: External ear normal       Left Ear: External ear normal       Nose: Nose normal    Eyes:      General: No scleral icterus  Conjunctiva/sclera: Conjunctivae normal    Cardiovascular:      Pulses: Normal pulses  Pulmonary:      Effort: Pulmonary effort is normal    Musculoskeletal:         General: Normal range of motion  Cervical back: Normal range of motion  Neurological:      General: No focal deficit present  Mental Status: He is alert and oriented to person, place, and time  Psychiatric:         Mood and Affect: Mood normal          Behavior: Behavior normal          Thought Content:  Thought content normal          Judgment: Judgment normal            Results  Recent Results (from the past 1 hour(s))   POCT Measure PVR    Collection Time: 10/31/22  7:39 AM   Result Value Ref Range    POST-VOID RESIDUAL VOLUME, ML POC 6 mL   POCT urine dip    Collection Time: 10/31/22  7:41 AM   Result Value Ref Range    LEUKOCYTE ESTERASE,UA -     NITRITE,UA -     SL AMB POCT UROBILINOGEN 0 02     POCT URINE PROTEIN -      PH,UA 5     BLOOD,UA -     SPECIFIC GRAVITY,UA 1 030     KETONES,UA -     BILIRUBIN,UA -     GLUCOSE, UA -      COLOR,UA branden     CLARITY,UA clear    ]  Lab Results   Component Value Date    PSA 0 5 09/22/2022     Lab Results   Component Value Date    CALCIUM 9 8 09/22/2022    K 4 1 09/22/2022    CO2 28 09/22/2022     09/22/2022    BUN 16 09/22/2022    CREATININE 1 16 09/22/2022 Lab Results   Component Value Date    WBC 12 95 (H) 09/22/2022    HGB 16 6 09/22/2022    HCT 48 3 09/22/2022    MCV 91 09/22/2022     09/22/2022           Orders  Orders Placed This Encounter   Procedures   • POCT Measure PVR   • POCT urine dip       Lorena Lever

## 2022-11-02 ENCOUNTER — OFFICE VISIT (OUTPATIENT)
Dept: INTERNAL MEDICINE CLINIC | Facility: CLINIC | Age: 42
End: 2022-11-02

## 2022-11-02 VITALS
OXYGEN SATURATION: 97 % | RESPIRATION RATE: 14 BRPM | WEIGHT: 248 LBS | BODY MASS INDEX: 34.72 KG/M2 | DIASTOLIC BLOOD PRESSURE: 88 MMHG | HEART RATE: 84 BPM | SYSTOLIC BLOOD PRESSURE: 126 MMHG | HEIGHT: 71 IN

## 2022-11-02 DIAGNOSIS — F41.9 ANXIETY: Primary | ICD-10-CM

## 2022-11-02 RX ORDER — ESCITALOPRAM OXALATE 10 MG/1
10 TABLET ORAL DAILY
Qty: 90 TABLET | Refills: 3
Start: 2022-11-02

## 2022-11-02 NOTE — PROGRESS NOTES
Assessment/Plan:    Anxiety is improved on Escitalopram  Increase dose to 10 mg daily to therapeutic level, to optimize response  Please contact me within the next few weeks with status update  Diagnoses and all orders for this visit:    Anxiety  -     escitalopram (Lexapro) 10 mg tablet; Take 1 tablet (10 mg total) by mouth daily    The patient was counseled regarding instructions for management, risk factor reductions, patient and family education,impressions, risks and benefits of treatment options, side effects of medications, importance of compliance with treatment  The treatment plan was reviewed with the patient/guardian and patient/guardian understands and agrees with the treatment plan          Current Outpatient Medications:   •  acyclovir (ZOVIRAX) 5 % ointment, Every 6 hours as needed, Disp: 15 g, Rfl: 1  •  albuterol (Ventolin HFA) 90 mcg/act inhaler, INHALE 1-2 PUFFS EVERY 4 HOURS AS NEEDED FOR COUGH FOR SHORTNESS OF BREATH OR WHEEZING, Disp: 54 g, Rfl: 5  •  Cholecalciferol (VITAMIN D3) 2000 units capsule, Take 1 capsule by mouth daily, Disp: , Rfl:   •  LORazepam (ATIVAN) 1 mg tablet, Take 1 tab po 30 minutes prior to lab work, Disp: 1 tablet, Rfl: 0  •  Melatonin 5 MG CAPS, Take 5 mg by mouth daily at bedtime as needed, Disp: , Rfl:   •  meloxicam (MOBIC) 15 mg tablet, TAKE 1 TABLET BY MOUTH EVERY DAY WITH FOOD FOR PAIN, Disp: 30 tablet, Rfl: 0  •  sildenafil (VIAGRA) 100 mg tablet, Take 1 tablet (100 mg total) by mouth daily as needed for erectile dysfunction, Disp: 10 tablet, Rfl: 11  •  tadalafil (CIALIS) 20 MG tablet, Take 1 tablet (20 mg total) by mouth daily as needed for erectile dysfunction, Disp: 10 tablet, Rfl: 11  •  tamsulosin (FLOMAX) 0 4 mg, TAKE 1 CAPSULE BY MOUTH EVERY DAY WITH DINNER, Disp: 90 capsule, Rfl: 1  •  vitamin E, tocopherol, 400 units capsule, Take 1 capsule (400 Units total) by mouth daily, Disp: 100 capsule, Rfl: 2  •  escitalopram (Lexapro) 10 mg tablet, Take 1 tablet (10 mg total) by mouth daily, Disp: 90 tablet, Rfl: 3    Subjective:      Patient ID: Shefali Murrieta is a 39 y o  male  Follow up to anxiety, feeling better on Escitalopram  He did follow up with [facility] for therapy  Breaking up long term relationship currently  The following portions of the patient's history were reviewed and updated as appropriate:   He has a past medical history of Asthma, Difficulty attaining erection, Erectile dysfunction, Fear of needles, HTN (hypertension), Hypertension (2011), Kidney disease, Low HDL (under 40), and Wheezing ,  does not have any pertinent problems on file  ,   has a past surgical history that includes Root canal ,  family history includes Asthma in his family; Cancer in his family; Gout in his father; Hypertension in his mother; Multiple sclerosis in his brother, mother, and paternal uncle; Other in his family; Tremor in his brother, mother, and paternal uncle ,   reports that he has never smoked  He has never used smokeless tobacco  He reports that he does not drink alcohol and does not use drugs  ,  is allergic to dust mite extract, molds & smuts, and penicillins       Review of Systems   Constitutional: Negative  Respiratory: Negative  Cardiovascular: Negative  Musculoskeletal: Negative  Psychiatric/Behavioral: Negative            Objective:  /88 (BP Location: Left arm, Patient Position: Sitting)   Pulse 84   Resp 14   Ht 5' 11" (1 803 m)   Wt 112 kg (248 lb)   SpO2 97%   BMI 34 59 kg/m²     Lab Review  Office Visit on 10/31/2022   Component Date Value   • POST-VOID RESIDUAL VOLUM* 10/31/2022 6    • LEUKOCYTE ESTERASE,UA 10/31/2022 -    • NITRITE,UA 10/31/2022 -    • SL AMB POCT UROBILINOGEN 10/31/2022 0 02    • POCT URINE PROTEIN 10/31/2022 -    •  PH,UA 10/31/2022 5    • BLOOD,UA 10/31/2022 -    • SPECIFIC GRAVITY,UA 10/31/2022 1 030    • KETONES,UA 10/31/2022 -    • BILIRUBIN,UA 10/31/2022 -    • GLUCOSE, UA 10/31/2022 -    •  Gretchen Torres 10/31/2022 branden    • CLARITY,UA 10/31/2022 clear    Appointment on 09/22/2022   Component Date Value   • WBC 09/22/2022 12 95 (A)   • RBC 09/22/2022 5 33    • Hemoglobin 09/22/2022 16 6    • Hematocrit 09/22/2022 48 3    • MCV 09/22/2022 91    • MCH 09/22/2022 31 1    • MCHC 09/22/2022 34 4    • RDW 09/22/2022 12 6    • MPV 09/22/2022 9 1    • Platelets 77/42/2959 212    • nRBC 09/22/2022 0    • Neutrophils Relative 09/22/2022 83 (A)   • Immat GRANS % 09/22/2022 0    • Lymphocytes Relative 09/22/2022 11 (A)   • Monocytes Relative 09/22/2022 5    • Eosinophils Relative 09/22/2022 1    • Basophils Relative 09/22/2022 0    • Neutrophils Absolute 09/22/2022 10 67 (A)   • Immature Grans Absolute 09/22/2022 0 05    • Lymphocytes Absolute 09/22/2022 1 41    • Monocytes Absolute 09/22/2022 0 70    • Eosinophils Absolute 09/22/2022 0 08    • Basophils Absolute 09/22/2022 0 04    • Sodium 09/22/2022 138    • Potassium 09/22/2022 4 1    • Chloride 09/22/2022 106    • CO2 09/22/2022 28    • ANION GAP 09/22/2022 4    • BUN 09/22/2022 16    • Creatinine 09/22/2022 1 16    • Glucose, Fasting 09/22/2022 105 (A)   • Calcium 09/22/2022 9 8    • AST 09/22/2022 16    • ALT 09/22/2022 36    • Alkaline Phosphatase 09/22/2022 54    • Total Protein 09/22/2022 8 3    • Albumin 09/22/2022 4 3    • Total Bilirubin 09/22/2022 1 10 (A)   • eGFR 09/22/2022 77    • TSH 3RD GENERATON 09/22/2022 1 250    • Testosterone, Free 09/22/2022 8 6    • TESTOSTERONE TOTAL 09/22/2022 528    • PSA 09/22/2022 0 5    • Hemoglobin A1C 09/22/2022 5 3    • EAG 09/22/2022 105         Imaging: No results found  Physical Exam  Constitutional:       Appearance: He is well-developed  Cardiovascular:      Rate and Rhythm: Normal rate and regular rhythm  Heart sounds: Normal heart sounds  Pulmonary:      Effort: Pulmonary effort is normal       Breath sounds: Normal breath sounds  Musculoskeletal:         General: Normal range of motion     Neurological: Mental Status: He is alert and oriented to person, place, and time  Deep Tendon Reflexes: Reflexes are normal and symmetric  Psychiatric:         Behavior: Behavior normal          Thought Content:  Thought content normal          Judgment: Judgment normal

## 2022-11-02 NOTE — PATIENT INSTRUCTIONS
Anxiety is improved on Escitalopram  Increase dose to 10 mg daily to therapeutic level, to optimize response  Please contact me within the next few weeks with status update

## 2022-11-23 ENCOUNTER — SOCIAL WORK (OUTPATIENT)
Dept: BEHAVIORAL/MENTAL HEALTH CLINIC | Facility: CLINIC | Age: 42
End: 2022-11-23

## 2022-11-23 DIAGNOSIS — F41.9 ANXIETY: ICD-10-CM

## 2022-11-23 DIAGNOSIS — F32.9 REACTIVE DEPRESSION: Primary | ICD-10-CM
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
## 2022-12-07 ENCOUNTER — TELEMEDICINE (OUTPATIENT)
Dept: BEHAVIORAL/MENTAL HEALTH CLINIC | Facility: CLINIC | Age: 42
End: 2022-12-07

## 2022-12-07 DIAGNOSIS — F41.9 ANXIETY: Primary | ICD-10-CM

## 2022-12-07 DIAGNOSIS — F32.9 REACTIVE DEPRESSION: ICD-10-CM

## 2022-12-07 NOTE — PSYCH
Virtual Regular Visit    Verification of patient location:    Patient is located in the following state in which I hold an active license [place]      Assessment/Plan:    Problem List Items Addressed This Visit        Other    Anxiety - Primary    Reactive depression       Goals addressed in session: Goal 1          Reason for visit is therapy     Chief Complaint   Patient presents with   • Virtual Regular Visit          Encounter provider Yulissa Olivo    Provider located at [address] [place] [address] [address]  [facility]-3  [address] [facility] [place] [address] [phone]      Recent Visits  No visits were found meeting these conditions  Showing recent visits within past 7 days and meeting all other requirements  Today's Visits  Date Type Provider Dept   12/07/22 2[address] [facility] Internal Med   Showing today's visits and meeting all other requirements  Future Appointments  No visits were found meeting these conditions  Showing future appointments within next 150 days and meeting all other requirements       The patient was identified by name and date of birth  Alex Laird was informed that this is a telemedicine visit and that the visit is being conducted throught[facility]  He agrees to proceed     My office door was closed  No one else was in the room  He acknowledged consent and understanding of privacy and security of the video platform  The patient has agreed to participate and understands they can discontinue the visit at any time  Patient is aware this is a billable service  Subjective  Alex Laird is a 43 y o  male who is in the middle of a break up  Psychotherapy Provided: Individual Psychotherapy 45 minutes     Length of time in session: 45 minutes, follow up in 3-4 week    Encounter Diagnosis     ICD-10-CM    1  Anxiety  F41 9       2   Reactive depression F32 9           Goals addressed in session: Goal 1   Pain:  None    none    0    Current suicide risk : Low     D: CRISTO states that his girlfriend is getting the rest of her stuff out of his house  He has mixed feelings  He states when he spends time with her, he realizes why they have to break up  He is not sleeping well, waking up as he is reflecting on the situation with his girlfriend  He states that he still loves his girlfriend very much  He could not answer what he loves about her  She has gotten everything out that she could without having a truck  He feels her dresser and bed frame needs to go  He states that she is doing things that she normally would not have done  She does not have a   He is treating her like she is his child  He feels she is doing more for herself  She becomes easily overwhelmed  He never talks to her on the phone  He is appropriately dressed and well groomed, being overweight  His mood is dysthymic and affect is congruent  His thought process is logical and speech is normal rate, volume and content  He states that they have communication issues and a non existent sex life where they were not compatible  His father had medical issues and that was concerning as well  Before this 10 year relationship he had not been in a relationship for about 10 years  He feels like he has a pretty good head on his shoulders, loyal to a fault, caring, responsible and he needs someone who communicates, supports him and is a partner not someone to take care of  He would like someone who is pursing their own goals and they can support each other to grow  He needs someone who is trustworthy  He feels he has accepted a lot for a long time, as he has stalled his own life  He does IT consulting on the side and she has put them off to try and help her and then she is not available  After 10 years his parents have never really known her    His mom does not like the way he is while he is with her  He states that his mother is in a relationship that she no longer wants to be in  Her  only comes home on the weekends (she lives next door) and his niece and nephew live there and she takes care of them  His girlfriend wants to visit with the dogs and the dog prefers him  Support was offered as well as validation of feelings  He has been trying to do things that he wants to do such as going to concerts  He is getting back into his own interests  Augustine Koroma appears to be making some progress as evidenced by his getting his girlfriend out of his house although at a snails pace  His area of concern is his relationship and his inability to cut ties and set boundaries  Maribeth Nixon will follow up next month and he will continue to get the rest of his girlfriends stuff out of his house and set firmer boundaries         Behavioral Health Treatment Plan [facility]: Diagnosis and Treatment Plan explained to Víctor Jaramillo relates understanding diagnosis and is agreeable to Treatment Plan   Yes     Visit start and stop times:    12/07/22  Start Time: 1200  Stop Time: 1245  Total Visit Time: 45 minutes      HPI     Past Medical History:   Diagnosis Date   • Asthma    • Difficulty attaining erection    • Erectile dysfunction    • Fear of needles     pt will pass out in the presents of needles   • HTN (hypertension)    • Hypertension 2011   • Kidney disease    • Low HDL (under 40)     Last assessed 02/08/17   • Wheezing        Past Surgical History:   Procedure Laterality Date   • ROOT CANAL         Current Outpatient Medications   Medication Sig Dispense Refill   • acyclovir (ZOVIRAX) 5 % ointment Every 6 hours as needed 15 g 1   • albuterol (Ventolin HFA) 90 mcg/act inhaler INHALE 1-2 PUFFS EVERY 4 HOURS AS NEEDED FOR COUGH FOR SHORTNESS OF BREATH OR WHEEZING 54 g 5   • Cholecalciferol (VITAMIN D3) 2000 units capsule Take 1 capsule by mouth daily     • escitalopram (Lexapro) 10 mg tablet Take 1 tablet (10 mg total) by mouth daily 90 tablet 3   • LORazepam (ATIVAN) 1 mg tablet Take 1 tab po 30 minutes prior to lab work 1 tablet 0   • Melatonin 5 MG CAPS Take 5 mg by mouth daily at bedtime as needed     • meloxicam (MOBIC) 15 mg tablet TAKE 1 TABLET BY MOUTH EVERY DAY WITH FOOD FOR PAIN 30 tablet 0   • sildenafil (VIAGRA) 100 mg tablet Take 1 tablet (100 mg total) by mouth daily as needed for erectile dysfunction 10 tablet 11   • tadalafil (CIALIS) 20 MG tablet Take 1 tablet (20 mg total) by mouth daily as needed for erectile dysfunction 10 tablet 11   • tamsulosin (FLOMAX) 0 4 mg TAKE 1 CAPSULE BY MOUTH EVERY DAY WITH DINNER 90 capsule 1   • vitamin E, tocopherol, 400 units capsule Take 1 capsule (400 Units total) by mouth daily 100 capsule 2     No current facility-administered medications for this visit  Allergies   Allergen Reactions   • Dust Mite Extract    • Molds & Smuts    • Penicillins        Review of Systems  CRISTO denies any SI/HI or AH/VH    Video Exam    There were no vitals filed for this visit  Physical Exam  Mental Health: Mary Roberto reports taking his medications as prescribed

## 2022-12-16 DIAGNOSIS — R39.12 WEAK URINARY STREAM: ICD-10-CM

## 2022-12-16 DIAGNOSIS — F41.9 ANXIETY: ICD-10-CM

## 2022-12-16 RX ORDER — TAMSULOSIN HYDROCHLORIDE 0.4 MG/1
CAPSULE ORAL
Qty: 90 CAPSULE | Refills: 1 | Status: SHIPPED | OUTPATIENT
Start: 2022-12-16

## 2022-12-16 RX ORDER — ESCITALOPRAM OXALATE 10 MG/1
10 TABLET ORAL DAILY
Qty: 90 TABLET | Refills: 3 | Status: SHIPPED | OUTPATIENT
Start: 2022-12-16

## 2022-12-20 ENCOUNTER — OFFICE VISIT (OUTPATIENT)
Dept: INTERNAL MEDICINE CLINIC | Facility: CLINIC | Age: 42
End: 2022-12-20

## 2022-12-20 VITALS
HEIGHT: 71 IN | DIASTOLIC BLOOD PRESSURE: 78 MMHG | SYSTOLIC BLOOD PRESSURE: 124 MMHG | WEIGHT: 258.6 LBS | RESPIRATION RATE: 16 BRPM | OXYGEN SATURATION: 97 % | BODY MASS INDEX: 36.2 KG/M2 | HEART RATE: 84 BPM

## 2022-12-20 DIAGNOSIS — M54.42 ACUTE LEFT-SIDED LOW BACK PAIN WITH LEFT-SIDED SCIATICA: Primary | ICD-10-CM

## 2022-12-20 DIAGNOSIS — G47.33 OSA (OBSTRUCTIVE SLEEP APNEA): ICD-10-CM

## 2022-12-20 NOTE — PROGRESS NOTES
Assessment/Plan:      Patient is here with left lower back pain  Has been present for a few months  Pain radiates down his left leg  changing positions helps the pain  No red flag symptoms like urinary incontinence  Quality Measures:     BMI Counseling: There is no height or weight on file to calculate BMI  The BMI is above normal  Nutrition recommendations include decreasing portion sizes and encouraging healthy choices of fruits and vegetables  Exercise recommendations include moderate physical activity 150 minutes/week  Rationale for BMI follow-up plan is due to patient being overweight or obese  Return in about 6 months (around 6/20/2023)  No problem-specific Assessment & Plan notes found for this encounter  Diagnoses and all orders for this visit:    Acute left-sided low back pain with left-sided sciatica  -     XR spine lumbar minimum 4 views non injury; Future  -     Ambulatory Referral to Physical Therapy; Future    EDMOND (obstructive sleep apnea)  -     Ambulatory Referral to Sleep Medicine; Future          Subjective:      Patient ID: Estella Severin is a 43 y o  male  Here with low back pain        ALLERGIES:  Allergies   Allergen Reactions   • Dust Mite Extract    • Molds & Smuts    • Penicillins        CURRENT MEDICATIONS:    Current Outpatient Medications:   •  acyclovir (ZOVIRAX) 5 % ointment, Every 6 hours as needed, Disp: 15 g, Rfl: 1  •  albuterol (Ventolin HFA) 90 mcg/act inhaler, INHALE 1-2 PUFFS EVERY 4 HOURS AS NEEDED FOR COUGH FOR SHORTNESS OF BREATH OR WHEEZING, Disp: 54 g, Rfl: 5  •  Cholecalciferol (VITAMIN D3) 2000 units capsule, Take 1 capsule by mouth daily, Disp: , Rfl:   •  escitalopram (Lexapro) 10 mg tablet, Take 1 tablet (10 mg total) by mouth daily, Disp: 90 tablet, Rfl: 3  •  Melatonin 5 MG CAPS, Take 5 mg by mouth daily at bedtime as needed, Disp: , Rfl:   •  meloxicam (MOBIC) 15 mg tablet, TAKE 1 TABLET BY MOUTH EVERY DAY WITH FOOD FOR PAIN, Disp: 30 tablet, Rfl: 0  •  sildenafil (VIAGRA) 100 mg tablet, Take 1 tablet (100 mg total) by mouth daily as needed for erectile dysfunction, Disp: 10 tablet, Rfl: 11  •  tadalafil (CIALIS) 20 MG tablet, Take 1 tablet (20 mg total) by mouth daily as needed for erectile dysfunction, Disp: 10 tablet, Rfl: 11  •  tamsulosin (FLOMAX) 0 4 mg, TAKE 1 CAPSULE BY MOUTH EVERY DAY WITH DINNER, Disp: 90 capsule, Rfl: 1  •  vitamin E, tocopherol, 400 units capsule, Take 1 capsule (400 Units total) by mouth daily, Disp: 100 capsule, Rfl: 2    ACTIVE PROBLEM LIST:  Patient Active Problem List   Diagnosis   • Benign familial tremor   • Dysplastic nevi   • Erectile dysfunction of non-organic origin   • Fatty liver disease, nonalcoholic   • Hip pain, right   • Low HDL (under 40)   • Mild intermittent asthma with acute exacerbation   • Obesity due to excess calories, unspecified obesity severity   • EDMOND on CPAP   • Lumbar radiculitis   • Low serum HDL   • Protrusion of lumbar intervertebral disc   • Family history of stroke mother 62   • Lactose intolerance   • Recurrent cold sores   • Acute bilateral low back pain without sciatica   • Knee swelling   • Weak urinary stream   • Skin lesions   • Visit for eye and vision exam   • Fear of needles   • Anxiety   • Reactive depression       PAST MEDICAL HISTORY:  Past Medical History:   Diagnosis Date   • Asthma    • Difficulty attaining erection    • Erectile dysfunction    • Fear of needles     pt will pass out in the presents of needles   • HTN (hypertension)    • Hypertension 2011   • Kidney disease    • Low HDL (under 40)     Last assessed 02/08/17   • Wheezing        PAST SURGICAL HISTORY:  Past Surgical History:   Procedure Laterality Date   • ROOT CANAL         FAMILY HISTORY:  Family History   Problem Relation Age of Onset   • Hypertension Mother    • Multiple sclerosis Mother    • Tremor Mother    • Gout Father    • Multiple sclerosis Brother    • Tremor Brother    • Multiple sclerosis Paternal Uncle    • Tremor Paternal Uncle    • Asthma Family    • Other Family         CVA   • Cancer Family        SOCIAL HISTORY:  Social History     Socioeconomic History   • Marital status: Single     Spouse name: Not on file   • Number of children: Not on file   • Years of education: Not on file   • Highest education level: Not on file   Occupational History   • Not on file   Tobacco Use   • Smoking status: Never   • Smokeless tobacco: Never   Vaping Use   • Vaping Use: Never used   Substance and Sexual Activity   • Alcohol use: No   • Drug use: No   • Sexual activity: Yes     Partners: Female   Other Topics Concern   • Not on file   Social History Narrative    Always uses seatbelt     Social Determinants of Health     Financial Resource Strain: Not on file   Food Insecurity: Not on file   Transportation Needs: Not on file   Physical Activity: Not on file   Stress: Not on file   Social Connections: Not on file   Intimate Partner Violence: Not on file   Housing Stability: Not on file       Review of Systems   Musculoskeletal: Positive for arthralgias and back pain  All other systems reviewed and are negative  Objective:  Vitals:    12/20/22 1624   BP: 124/78   BP Location: Left arm   Patient Position: Sitting   Cuff Size: Adult   Pulse: 84   Resp: 16   SpO2: 97%   Weight: 117 kg (258 lb 9 6 oz)   Height: 5' 11" (1 803 m)     Body mass index is 36 07 kg/m²  Physical Exam  Vitals and nursing note reviewed  Constitutional:       Appearance: Normal appearance  HENT:      Head: Normocephalic and atraumatic  Cardiovascular:      Rate and Rhythm: Normal rate  Pulmonary:      Effort: Pulmonary effort is normal    Musculoskeletal:         General: Normal range of motion  Cervical back: Normal range of motion  Skin:     General: Skin is warm and dry  Neurological:      General: No focal deficit present  Mental Status: He is alert and oriented to person, place, and time   Mental status is at baseline  Psychiatric:         Mood and Affect: Mood normal            RESULTS:    No results found for this or any previous visit (from the past 1008 hour(s))  This note was created with voice recognition software  Phonic, grammatical and spelling errors may be present within the note as a result

## 2023-01-04 ENCOUNTER — TELEMEDICINE (OUTPATIENT)
Dept: BEHAVIORAL/MENTAL HEALTH CLINIC | Facility: CLINIC | Age: 43
End: 2023-01-04

## 2023-01-04 DIAGNOSIS — F41.9 ANXIETY: ICD-10-CM

## 2023-01-04 DIAGNOSIS — F32.9 REACTIVE DEPRESSION: Primary | ICD-10-CM

## 2023-01-04 NOTE — PSYCH
Virtual Regular Visit    Verification of patient location:    Patient is located in the following state in which I hold an active license [place]      Assessment/Plan:  Therapy for reactive depression and anxiety    Problem List Items Addressed This Visit        Other    Anxiety    Reactive depression - Primary       Goals addressed in session: Goal 1          Reason for visit is Therapy     Chief Complaint   Patient presents with   • Virtual Regular Visit          Encounter provider Lexus Lira    Provider located at [address] [place] [address] [address]  [facility]3  [address] [facility] [address] [phone]      Recent Visits  No visits were found meeting these conditions  Showing recent visits within past 7 days and meeting all other requirements  Today's Visits  Date Type Provider Dept   01/04/23 2[address] [employer] Internal Med   Showing today's visits and meeting all other requirements  Future Appointments  No visits were found meeting these conditions  Showing future appointments within next 150 days and meeting all other requirements       The patient was identified by name and date of birth  Jody Castaneda was informed that this is a telemedicine visit and [facility]  He agrees to proceed     My office door was closed  No one else was in the room  He acknowledged consent and understanding of privacy and security of the video platform  The patient has agreed to participate and understands they can discontinue the visit at any time  Patient is aware this is a billable service  Subjective  Jody Castaneda is a 43 y o  male who recently went through a break up        HPI     Past Medical History:   Diagnosis Date   • Asthma    • Difficulty attaining erection    • Erectile dysfunction    • Fear of needles     pt will pass out in the presents of needles   • HTN (hypertension)    • Hypertension 2011   • Kidney disease    • Low HDL (under 40)     Last assessed 02/08/17   • Wheezing        Past Surgical History:   Procedure Laterality Date   • ROOT CANAL         Current Outpatient Medications   Medication Sig Dispense Refill   • acyclovir (ZOVIRAX) 5 % ointment Every 6 hours as needed 15 g 1   • albuterol (Ventolin HFA) 90 mcg/act inhaler INHALE 1-2 PUFFS EVERY 4 HOURS AS NEEDED FOR COUGH FOR SHORTNESS OF BREATH OR WHEEZING 54 g 5   • Cholecalciferol (VITAMIN D3) 2000 units capsule Take 1 capsule by mouth daily     • escitalopram (Lexapro) 10 mg tablet Take 1 tablet (10 mg total) by mouth daily 90 tablet 3   • Melatonin 5 MG CAPS Take 5 mg by mouth daily at bedtime as needed     • meloxicam (MOBIC) 15 mg tablet TAKE 1 TABLET BY MOUTH EVERY DAY WITH FOOD FOR PAIN 30 tablet 0   • sildenafil (VIAGRA) 100 mg tablet Take 1 tablet (100 mg total) by mouth daily as needed for erectile dysfunction 10 tablet 11   • tadalafil (CIALIS) 20 MG tablet Take 1 tablet (20 mg total) by mouth daily as needed for erectile dysfunction 10 tablet 11   • tamsulosin (FLOMAX) 0 4 mg TAKE 1 CAPSULE BY MOUTH EVERY DAY WITH DINNER 90 capsule 1   • vitamin E, tocopherol, 400 units capsule Take 1 capsule (400 Units total) by mouth daily 100 capsule 2     No current facility-administered medications for this visit  Allergies   Allergen Reactions   • Dust Mite Extract    • Molds & Smuts    • Penicillins        Review of Systems   CRISTO reports taking medical medications as prescribed,    Video Exam    There were no vitals filed for this visit  Physical Exam  CRISTO denies any SI/HI or AH/VH  Psychotherapy Provided: Individual Psychotherapy 45 minutes     Length of time in session: 45 minutes, follow up in 3-4 week    Encounter Diagnosis     ICD-10-CM    1  Reactive depression  F32 9       2   Anxiety  F41 9           Goals addressed in session: Goal 1     Pain: none    0    Current suicide risk : Low   D: CRISTO states that his ex is struggling financially and he is afraid that she is going to fail  He is trying to 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 which he placed on a back burner and he is planning some concerts both of which he also placed on a back burner due to the relationship  Beatriz Mejia is financially behind on the rent and his mother is his landlord which can make it stressful  He feels that he does not make enough so he is not financially doing well either  He is buying hobby things and he needs to make better decisions for spending by his own report  He wants to move ahead and he needs the certifications to make this happen  He is appropriately dressed in a casual manner, being adequately groomed and being overweight  His mood is anxious and affect is congruent  His thought process is logical and speech is normal rate, volume and content  He wants to move into a  position and needs to get the skills he needs to attain this position  He states he is trying to switch gears at this point  He feels his ex is trying to focus on herself  He is reconnecting with some friends over a [employer] for a friend  His friend had Angelica's disease  CRISTO's mother has MS  He feels like he does not have any symptoms but his brother does  He states that he is lonely but has been enjoying some of the living alone  He saw his ex a few times in the past few weeks due to the holidays  He has not really gone over to her place either  He has to go to the office 2 days a week  He feels he does not care for his coworkers so he is grateful when they are not in the office  Willinew Fordcleve appears to be making some progress as evidenced by his setting healthy boundaries with his ex  His focus is on himself which is also a sign of progress  Ru Johnson will follow up in the next 3-4 weeks  Dara Soulier He will continue to set positive boundaries with the people around  Behavioral Health Treatment Plan [facility]: Diagnosis and Treatment Plan explained to Robert Soto relates understanding diagnosis and is agreeable to Treatment Plan   Yes     Visit start and stop times:    01/04/23  Start Time: 1159  Stop Time: 1245  Total Visit Time: 46 minutes

## 2023-01-25 ENCOUNTER — TELEMEDICINE (OUTPATIENT)
Dept: BEHAVIORAL/MENTAL HEALTH CLINIC | Facility: CLINIC | Age: 43
End: 2023-01-25

## 2023-01-25 DIAGNOSIS — F41.9 ANXIETY: ICD-10-CM

## 2023-01-25 DIAGNOSIS — F32.9 REACTIVE DEPRESSION: Primary | ICD-10-CM

## 2023-01-25 NOTE — PSYCH
Virtual Regular Visit    Verification of patient location:    Patient is located in the following state in which I hold an active license [place]      Assessment/Plan:    Problem List Items Addressed This Visit        Other    Anxiety    Reactive depression - Primary       Goals addressed in session: Goal 1          Reason for visit is   Chief Complaint   Patient presents with   • Virtual Regular Visit        Encounter provider Óscar Valente    Provider located at [address] [facility] [address]  [facility] [address]  [address] [facility] [place] [address] [phone]      Recent Visits  No visits were found meeting these conditions  Showing recent visits within past 7 days and meeting all other requirements  Today's Visits  Date Type Provider Dept   01/25/23 [facility] Med   Showing today's visits and meeting all other requirements  Future Appointments  No visits were found meeting these conditions  Showing future appointments within next 150 days and meeting all other requirements       The patient was identified by name and date of birth  Lucio Mark was informed that this is a telemedicine visit and [facility]  He agrees to proceed     My office door was closed  No one else was in the room  He acknowledged consent and Behavioral Health Psychotherapy Progress Note    Psychotherapy Provided: Individual Psychotherapy     1  Reactive depression        2  Anxiety            Goals addressed in session: Goal 1     DATA: CRISTO is appropriately dressed for the appointment and appropriately groomed  He states he has not seen his ex that much and he is trying to keep his space  He is reconnecting with other friends of his  He is working hard at work and he hopes in the end it will pay off    He is working on training but last week he was real busy and did not do many training's  He is trying to take on more responsibility  He says it has been easier and life is simpler without her there, but it has also been lonely  He feels like he has been sick and he was hanging out with his nephew  His nephew is six  He did not see his niece and nephew that often when his ex lives there  He is interacting with his family a lot more  He was asked what he does when he feels lonely and he has been spending time with his dog  He feels his coping skills have been sleeping and spending time with the dog  His ex wants to hang out when she sees the dog  He states his ex sent over a daybed as she wanted to be able to stay there  He told her to get it out of there as he is not dedicating a space for her  Support was offered as well as validation of feelings  He does feel like he outgrew the relationship  He is trying to stay out of the relationship with his ex and her family  He states that he does not like the local weather, and wanted to initially move [address]  His dad was recently diagnosed with diabetes  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  We discussed some of his prior relationships  During this session, this clinician used the following therapeutic modalities: Cognitive Behavioral Therapy and Supportive Psychotherapy    Substance Abuse was not addressed during this session  If the client is diagnosed with a co-occurring substance use disorder, please indicate any changes in the frequency or amount of use: N/A  Stage of change for addressing substance use diagnoses: No substance use/Not applicable    ASSESSMENT:  Merlin James presents with a Euthymic/ normal mood  CRISTO appears to be making progress as evidenced by his setting firm boundaries with his ex and not wanting to spend time with her        his affect is Normal range and intensity, which is congruent, with his mood and the content of the session   The client has made progress on their goals  Aravind Zepeda presents with a none risk of suicide, none risk of self-harm, and none risk of harm to others  For any risk assessment that surpasses a "low" rating, a safety plan must be developed  A safety plan was indicated: no  If yes, describe in detail N/A    PLAN: Between sessions, Aravind Zepeda will keep firm boundaries with his ex  At the next session, the therapist will use Cognitive Behavioral Therapy to continue to work with him to keep the firm boundaries set       Behavioral Health Treatment Plan and Discharge Planning: Aravind Zepeda is aware of and agrees to continue to work on their treatment plan  They have identified and are working toward their discharge goals  yes    Visit start and stop times:    01/25/23  Start Time: 1300understanding of privacy and security of the video platform  The patient has agreed to participate and understands they can discontinue the visit at any time  Patient is aware this is a billable service  Danuta Olmedo is a 43 y o  male Who is participating in therapy to assist with managing symptoms of depression and anxiety  [place]     Past Medical History:   Diagnosis Date   • Asthma    • Difficulty attaining erection    • Erectile dysfunction    • Fear of needles     pt will pass out in the presents of needles   • HTN (hypertension)    • Hypertension 2011   • Kidney disease    • Low HDL (under 40)     Last assessed 02/08/17   • Wheezing        Past Surgical History:   Procedure Laterality Date   • ROOT CANAL         Current Outpatient Medications   Medication Sig Dispense Refill   • acyclovir (ZOVIRAX) 5 % ointment Every 6 hours as needed 15 g 1   • albuterol (Ventolin HFA) 90 mcg/act inhaler INHALE 1-2 PUFFS EVERY 4 HOURS AS NEEDED FOR COUGH FOR SHORTNESS OF BREATH OR WHEEZING 54 g 5   • Cholecalciferol (VITAMIN D3) 2000 units capsule Take 1 capsule by mouth daily     • escitalopram (Lexapro) 10 mg tablet Take 1 tablet (10 mg total) by mouth daily 90 tablet 3   • Melatonin 5 MG CAPS Take 5 mg by mouth daily at bedtime as needed     • meloxicam (MOBIC) 15 mg tablet TAKE 1 TABLET BY MOUTH EVERY DAY WITH FOOD FOR PAIN 30 tablet 0   • sildenafil (VIAGRA) 100 mg tablet Take 1 tablet (100 mg total) by mouth daily as needed for erectile dysfunction 10 tablet 11   • tadalafil (CIALIS) 20 MG tablet Take 1 tablet (20 mg total) by mouth daily as needed for erectile dysfunction 10 tablet 11   • tamsulosin (FLOMAX) 0 4 mg TAKE 1 CAPSULE BY MOUTH EVERY DAY WITH DINNER 90 capsule 1   • vitamin E, tocopherol, 400 units capsule Take 1 capsule (400 Units total) by mouth daily 100 capsule 2     No current facility-administered medications for this visit  Allergies   Allergen Reactions   • Dust Mite Extract    • Molds & Smuts    • Penicillins        Review of Systems  CRISTO states that he is taking medications as prescribed  Video Exam    There were no vitals filed for this visit      Physical Exam  Mental Health- CRISTO denies any SI/HI or AH/VH

## 2023-02-22 ENCOUNTER — TELEMEDICINE (OUTPATIENT)
Dept: BEHAVIORAL/MENTAL HEALTH CLINIC | Facility: CLINIC | Age: 43
End: 2023-02-22

## 2023-02-22 DIAGNOSIS — F32.9 REACTIVE DEPRESSION: ICD-10-CM

## 2023-02-22 DIAGNOSIS — F41.9 ANXIETY: Primary | ICD-10-CM

## 2023-02-22 NOTE — PSYCH
Behavioral Health Psychotherapy Progress Note    Psychotherapy Provided: Individual Psychotherapy     1  Anxiety        2  Reactive depression            Goals addressed in session: Goal 1     DATA: CRISTO states that she has been seeing his ex girlfriend Elissa Ford at his house and he is having her change it to her own address  He feels like there are more steps for them to be   He states that it is hard but they are not a good fit for each other  His work is going well for the most part  He was told to apologize to a coworker and he did not  He is working on his training videos  He states that he was busy last week and where he can he is doing the trainings  He states that he picked up a few new clients privately to consult  His mom is doing okay and it was her birthday yesterday  His dad was diagnosed with diabetes recently and he is concerned about this for himself as well  He states that he is spending some time with family and friends  He is appropriately dressed and well groomed, being overweight  His thought process is logical and speech is normal rate, volume and content  He is going to see a female friend in  May down in [place]  He has gone to a circus with his niece and nephew up at one of the local school district  He is on a dating sight  He would like to move somewhere warm to be away from the snow  His job allows him to work remote, but he has to be local as he can be on call  He says his parents who are only 61 are getting older and have health problems and he does feel the family ties are keeping him here  He has been spending more time with his niece and nephew since his ex girlfriend moved away  He does have her stop over and see the dog when he is working late  He discussed how he does not know what he wants at this point and wants to focus on himself  We discussed accountability for his eating habits  We processed how this is a concern          During this session, this clinician used the following therapeutic modalities: Cognitive Behavioral Therapy    Substance Abuse was not addressed during this session  If the client is diagnosed with a co-occurring substance use disorder, please indicate any changes in the frequency or amount of use: N/A  Stage of change for addressing substance use diagnoses: No substance use/Not applicable    ASSESSMENT:  Danielle Valdivia presents with a Euthymic/ normal mood  CRISTO appears to be making some progress as evidenced by his being accepting of their break up  He feels like he will call in the future as needed due to the progress with the breakup and his recognition that under no circumstances would this be a healthy relationship for him  his affect is Normal range and intensity, which is congruent, with his mood and the content of the session  The client has made progress on their goals  Danielle Valdivia presents with a none risk of suicide, none risk of self-harm, and none risk of harm to others  For any risk assessment that surpasses a "low" rating, a safety plan must be developed  A safety plan was indicated: no  If yes, describe in detail N/A    PLAN: Between sessions, Danielle Valdivia will call in the future as needed At the next session, the therapist will use Cognitive Behavioral Therapy to address his relationship issues       Behavioral Health Treatment Plan and Discharge Planning: Danielle Valdivia is aware of and agrees to continue to work on their treatment plan  They have identified and are working toward their discharge goals   yes    Visit start and stop times:    02/22/23  Start Time: 1200  Stop Time: 1245  Total Visit Time: 45 minutes

## 2023-02-23 ENCOUNTER — CONSULT (OUTPATIENT)
Dept: PULMONOLOGY | Facility: CLINIC | Age: 43
End: 2023-02-23

## 2023-02-23 VITALS
DIASTOLIC BLOOD PRESSURE: 84 MMHG | TEMPERATURE: 98.7 F | HEART RATE: 88 BPM | HEIGHT: 71 IN | WEIGHT: 273.8 LBS | OXYGEN SATURATION: 97 % | SYSTOLIC BLOOD PRESSURE: 114 MMHG | BODY MASS INDEX: 38.33 KG/M2

## 2023-02-23 DIAGNOSIS — G47.33 OSA (OBSTRUCTIVE SLEEP APNEA): ICD-10-CM

## 2023-02-23 DIAGNOSIS — E66.9 OBESITY (BMI 30-39.9): Primary | ICD-10-CM

## 2023-02-23 NOTE — PROGRESS NOTES
Assessment/Plan:     Diagnoses and all orders for this visit:    Obesity (BMI 30-39 9)    EDMOND (obstructive sleep apnea)  -     Ambulatory Referral to Sleep Medicine  -     Home Study; Future          Plan for follow up:  Patient has signs and symptoms of obstructive sleep apnea  Etiology pathogenesis of obstructive sleep apnea discussed in detail  Consequences of untreated sleep apnea discussed with excessive daytime sleepiness, increased risk for myocardial infarction stroke atrial fibrillation discussed  Testing procedure was discussed given the prior no prior sleep studies are available  Home sleep study was discussed with the patient  Testing procedure was discussed  We will have him help him get it scheduled  And once her testing is done will order the auto CPAP and he will start using it and will see him after that in 3 months with a CPAP download compliance  Various treatment options for the obstructive sleep apnea discussed with weight loss, mandibular advancing appliance, Pap machine and inspire were also discussed with the patient  Recommend weight loss lifestyle changes with dietary changes and exercise were discussed  Follow-up in 3 to 4 months or as needed earlier as needed  Return in about 4 months (around 6/23/2023)  All questions are answered to the patient's satisfaction and understanding  He verbalizes understanding  He is encouraged to call with any further questions or concerns  Portions of the record may have been created with voice recognition software  Occasional wrong word or "sound a like" substitutions may have occurred due to the inherent limitations of voice recognition software  Read the chart carefully and recognize, using context, where substitutions have occurred  a    Electronically Signed by Karen Wynn MD    ______________________________________________________________________    Chief Complaint:   Chief Complaint   Patient presents with   • Sleep Apnea Patient ID: Gilberto Londono is a 43 y o  y o  male has a past medical history of Asthma, Difficulty attaining erection, Erectile dysfunction, Fear of needles, HTN (hypertension), Hypertension (2011), Kidney disease, Low HDL (under 40), Sleep apnea, obstructive, and Wheezing     2/23/2023  Patient presents today for initial visit  Patient is a very pleasant 22-year-old gentleman, 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, 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, his daily sleep schedule is he goes to bed at around midnight can fall asleep within half an hour and is out of bed at 8 AM has 3 nocturnal awakenings for nocturia and that has been only recently he states  He can fall back asleep after the awakenings  When he is out of the bed he still tired and fatigued no history of any early morning headaches no symptoms related to restless legs  Has had prior history of obstructive sleep apnea diagnosed in 2014 and has been on CPAP until about a year ago he states when he realized that that was recalled and has not been using it since then  His prior history he states has been severe obstructive sleep apnea we do not have the records but do have the CPAP titration study that was done in 2014 where he was titrated to a CPAP of 8 cm  Currently without the use of CPAP he states he has been slowly starting to gain weight feels more tired and fatigued and also having more nocturia has been following up for that with his urologist as well and was still low back on the treatment for his sleep apnea for which she is here for evaluation  Occupational/Exposure history:  9 - 5 pm,   Pets/Enviroment: Dog  Travel history: None  Review of Systems   Constitutional: Positive for fatigue  HENT: Negative  Eyes: Negative  Respiratory: Negative  History of loud snoring witnessed apneic spells occasional choking and gasping for air  Cardiovascular: Negative  Gastrointestinal: Negative      Endocrine: Negative  Genitourinary: Negative  Musculoskeletal: Negative  Allergic/Immunologic: Negative  Neurological: Negative  Hematological: Negative  Psychiatric/Behavioral: Negative  Social history: He reports that he has never smoked  He has never used smokeless tobacco  He reports that he does not drink alcohol and does not use drugs      Past surgical history:   Past Surgical History:   Procedure Laterality Date   • ROOT CANAL       Family history:   Family History   Problem Relation Age of Onset   • Hypertension Mother    • Multiple sclerosis Mother    • Tremor Mother    • Gout Father    • Multiple sclerosis Brother    • Tremor Brother    • Multiple sclerosis Paternal Uncle    • Tremor Paternal Uncle    • Asthma Family    • Other Family         CVA   • Cancer Family        Immunization History   Administered Date(s) Administered   • Tuberculin Skin Test-PPD Intradermal 09/17/2013     Current Outpatient Medications   Medication Sig Dispense Refill   • acyclovir (ZOVIRAX) 5 % ointment Every 6 hours as needed 15 g 1   • albuterol (Ventolin HFA) 90 mcg/act inhaler INHALE 1-2 PUFFS EVERY 4 HOURS AS NEEDED FOR COUGH FOR SHORTNESS OF BREATH OR WHEEZING 54 g 5   • Cholecalciferol (VITAMIN D3) 2000 units capsule Take 1 capsule by mouth daily     • escitalopram (Lexapro) 10 mg tablet Take 1 tablet (10 mg total) by mouth daily 90 tablet 3   • Melatonin 5 MG CAPS Take 5 mg by mouth daily at bedtime as needed     • meloxicam (MOBIC) 15 mg tablet TAKE 1 TABLET BY MOUTH EVERY DAY WITH FOOD FOR PAIN 30 tablet 0   • sildenafil (VIAGRA) 100 mg tablet Take 1 tablet (100 mg total) by mouth daily as needed for erectile dysfunction 10 tablet 11   • tadalafil (CIALIS) 20 MG tablet Take 1 tablet (20 mg total) by mouth daily as needed for erectile dysfunction 10 tablet 11   • tamsulosin (FLOMAX) 0 4 mg TAKE 1 CAPSULE BY MOUTH EVERY DAY WITH DINNER 90 capsule 1   • vitamin E, tocopherol, 400 units capsule Take 1 capsule (400 Units total) by mouth daily 100 capsule 2     No current facility-administered medications for this visit  Allergies: Dust mite extract, Molds & smuts, and Penicillins    Objective:  Vitals:    02/23/23 0909   BP: 114/84   Pulse: 88   Temp: 98 7 °F (37 1 °C)   SpO2: 97%   Weight: 124 kg (273 lb 12 8 oz)   Height: 5' 11" (1 803 m)   Oxygen Therapy  SpO2: 97 %    Wt Readings from Last 3 Encounters:   02/23/23 124 kg (273 lb 12 8 oz)   12/20/22 117 kg (258 lb 9 6 oz)   11/02/22 112 kg (248 lb)     Body mass index is 38 19 kg/m²  Physical Exam  Constitutional:       Appearance: He is well-developed  HENT:      Head: Normocephalic and atraumatic  Eyes:      Pupils: Pupils are equal, round, and reactive to light  Neck:      Comments: Short and wide neck  Cardiovascular:      Rate and Rhythm: Normal rate and regular rhythm  Heart sounds: Normal heart sounds  Pulmonary:      Effort: Pulmonary effort is normal       Breath sounds: Normal breath sounds  Musculoskeletal:         General: Normal range of motion  Cervical back: Normal range of motion and neck supple  Skin:     General: Skin is warm and dry  Neurological:      Mental Status: He is alert and oriented to person, place, and time  Psychiatric:         Behavior: Behavior normal              Diagnostics:  I have personally reviewed pertinent reports      ESS: Total score: 4

## 2023-03-23 ENCOUNTER — HOSPITAL ENCOUNTER (OUTPATIENT)
Dept: SLEEP CENTER | Facility: CLINIC | Age: 43
Discharge: HOME | End: 2023-03-23

## 2023-03-23 DIAGNOSIS — G47.33 OSA (OBSTRUCTIVE SLEEP APNEA): ICD-10-CM

## 2023-04-10 DIAGNOSIS — G47.33 OSA (OBSTRUCTIVE SLEEP APNEA): Primary | ICD-10-CM

## 2023-05-20 DIAGNOSIS — J45.909 MODERATE ASTHMA WITHOUT COMPLICATION, UNSPECIFIED WHETHER PERSISTENT: ICD-10-CM

## 2023-05-22 RX ORDER — ALBUTEROL SULFATE 90 UG/1
AEROSOL, METERED RESPIRATORY (INHALATION)
Qty: 8 G | Refills: 1 | Status: SHIPPED | OUTPATIENT
Start: 2023-05-22

## 2023-06-19 ENCOUNTER — OFFICE VISIT (OUTPATIENT)
Dept: INTERNAL MEDICINE CLINIC | Facility: CLINIC | Age: 43
End: 2023-06-19
Payer: COMMERCIAL

## 2023-06-19 VITALS
BODY MASS INDEX: 40.8 KG/M2 | OXYGEN SATURATION: 96 % | DIASTOLIC BLOOD PRESSURE: 80 MMHG | HEIGHT: 71 IN | SYSTOLIC BLOOD PRESSURE: 118 MMHG | HEART RATE: 73 BPM | WEIGHT: 291.4 LBS

## 2023-06-19 DIAGNOSIS — G47.33 OSA (OBSTRUCTIVE SLEEP APNEA): ICD-10-CM

## 2023-06-19 DIAGNOSIS — J45.21 MILD INTERMITTENT ASTHMA WITH ACUTE EXACERBATION: ICD-10-CM

## 2023-06-19 DIAGNOSIS — E66.01 CLASS 3 SEVERE OBESITY DUE TO EXCESS CALORIES WITHOUT SERIOUS COMORBIDITY WITH BODY MASS INDEX (BMI) OF 40.0 TO 44.9 IN ADULT (HCC): ICD-10-CM

## 2023-06-19 DIAGNOSIS — E78.6 LOW HDL (UNDER 40): ICD-10-CM

## 2023-06-19 DIAGNOSIS — F52.21 ERECTILE DYSFUNCTION OF NON-ORGANIC ORIGIN: ICD-10-CM

## 2023-06-19 DIAGNOSIS — F40.298 FEAR OF NEEDLES: ICD-10-CM

## 2023-06-19 DIAGNOSIS — J45.909 MODERATE ASTHMA WITHOUT COMPLICATION, UNSPECIFIED WHETHER PERSISTENT: Primary | ICD-10-CM

## 2023-06-19 DIAGNOSIS — R03.0 ELEVATED BLOOD PRESSURE READING WITHOUT DIAGNOSIS OF HYPERTENSION: ICD-10-CM

## 2023-06-19 DIAGNOSIS — F41.9 ANXIETY: ICD-10-CM

## 2023-06-19 DIAGNOSIS — K76.0 FATTY LIVER DISEASE, NONALCOHOLIC: ICD-10-CM

## 2023-06-19 PROBLEM — M25.551 HIP PAIN, RIGHT: Status: RESOLVED | Noted: 2017-06-23 | Resolved: 2023-06-19

## 2023-06-19 PROBLEM — M51.26 PROTRUSION OF LUMBAR INTERVERTEBRAL DISC: Status: RESOLVED | Noted: 2018-08-24 | Resolved: 2023-06-19

## 2023-06-19 PROBLEM — R39.12 WEAK URINARY STREAM: Status: RESOLVED | Noted: 2022-08-08 | Resolved: 2023-06-19

## 2023-06-19 PROBLEM — Z01.00 VISIT FOR EYE AND VISION EXAM: Status: RESOLVED | Noted: 2022-08-08 | Resolved: 2023-06-19

## 2023-06-19 PROBLEM — G25.0 BENIGN FAMILIAL TREMOR: Status: RESOLVED | Noted: 2017-01-05 | Resolved: 2023-06-19

## 2023-06-19 PROBLEM — M54.16 LUMBAR RADICULITIS: Status: RESOLVED | Noted: 2018-02-20 | Resolved: 2023-06-19

## 2023-06-19 PROBLEM — M25.469 KNEE SWELLING: Status: RESOLVED | Noted: 2021-10-11 | Resolved: 2023-06-19

## 2023-06-19 PROBLEM — M54.50 ACUTE BILATERAL LOW BACK PAIN WITHOUT SCIATICA: Status: RESOLVED | Noted: 2019-09-11 | Resolved: 2023-06-19

## 2023-06-19 PROBLEM — L98.9 SKIN LESIONS: Status: RESOLVED | Noted: 2022-08-08 | Resolved: 2023-06-19

## 2023-06-19 PROCEDURE — 99214 OFFICE O/P EST MOD 30 MIN: CPT | Performed by: INTERNAL MEDICINE

## 2023-06-19 RX ORDER — ALBUTEROL SULFATE 90 UG/1
2 AEROSOL, METERED RESPIRATORY (INHALATION) EVERY 6 HOURS PRN
Qty: 18 G | Refills: 5 | Status: SHIPPED | OUTPATIENT
Start: 2023-06-19

## 2023-06-19 NOTE — PROGRESS NOTES
Assessment/Plan:     Keyona Morales is here for routine f/u; he shares with me he will be looking for a new job because he is being forced to return to the office, he works in IT; he denies any acute issues, doing well with lexapro, I did recommend staying on it for 1 year total, he is on 10 mg, cut in half for 2 weeks, then he can stop it; did see BT and no longer needs f/u;     he has EDMOND and I did suggest being compliant with his CPAP, he is waiting on new equipment;     has controlled asthma and uses Flomax prn for BPH  Labs before next visit  Quality Measures:     BMI Counseling: Body mass index is 40 64 kg/m²  The BMI is above normal  Nutrition recommendations include decreasing portion sizes and encouraging healthy choices of fruits and vegetables  Exercise recommendations include moderate physical activity 150 minutes/week  Rationale for BMI follow-up plan is due to patient being overweight or obese  Return in about 1 year (around 6/19/2024)  No problem-specific Assessment & Plan notes found for this encounter  Diagnoses and all orders for this visit:    Moderate asthma without complication, unspecified whether persistent  -     CBC and differential; Future  -     Comprehensive metabolic panel; Future  -     albuterol (Ventolin HFA) 90 mcg/act inhaler; Inhale 2 puffs every 6 (six) hours as needed for wheezing    EDMOND (obstructive sleep apnea)  -     CBC and differential; Future  -     Comprehensive metabolic panel; Future    Anxiety  -     TSH, 3rd generation with Free T4 reflex; Future    Elevated blood pressure reading without diagnosis of hypertension    Fatty liver disease, nonalcoholic    Low HDL (under 40)  -     Hemoglobin A1C; Future  -     Lipid Panel with Direct LDL reflex;  Future    Fear of needles    Erectile dysfunction of non-organic origin    Class 3 severe obesity due to excess calories without serious comorbidity with body mass index (BMI) of 40 0 to 44 9 in adult [facility])    Mild intermittent asthma with acute exacerbation          Subjective:      Patient ID: Naseem Meade is a 43 y o  male  Here to establish care; former [facility] pt        ALLERGIES:  Allergies   Allergen Reactions   • Dust Mite Extract    • Molds & Smuts    • Penicillins        CURRENT MEDICATIONS:    Current Outpatient Medications:   •  acyclovir (ZOVIRAX) 5 % ointment, Every 6 hours as needed, Disp: 15 g, Rfl: 1  •  albuterol (Ventolin HFA) 90 mcg/act inhaler, Inhale 2 puffs every 6 (six) hours as needed for wheezing, Disp: 18 g, Rfl: 5  •  Cholecalciferol (VITAMIN D3) 2000 units capsule, Take 1 capsule by mouth daily, Disp: , Rfl:   •  escitalopram (Lexapro) 10 mg tablet, Take 1 tablet (10 mg total) by mouth daily, Disp: 90 tablet, Rfl: 3  •  Melatonin 5 MG CAPS, Take 5 mg by mouth daily at bedtime as needed, Disp: , Rfl:   •  meloxicam (MOBIC) 15 mg tablet, TAKE 1 TABLET BY MOUTH EVERY DAY WITH FOOD FOR PAIN, Disp: 30 tablet, Rfl: 0  •  sildenafil (VIAGRA) 100 mg tablet, Take 1 tablet (100 mg total) by mouth daily as needed for erectile dysfunction, Disp: 10 tablet, Rfl: 11  •  tadalafil (CIALIS) 20 MG tablet, Take 1 tablet (20 mg total) by mouth daily as needed for erectile dysfunction, Disp: 10 tablet, Rfl: 11  •  tamsulosin (FLOMAX) 0 4 mg, TAKE 1 CAPSULE BY MOUTH EVERY DAY WITH DINNER, Disp: 90 capsule, Rfl: 1  •  vitamin E, tocopherol, 400 units capsule, Take 1 capsule (400 Units total) by mouth daily, Disp: 100 capsule, Rfl: 2    ACTIVE PROBLEM LIST:  Patient Active Problem List   Diagnosis   • Dysplastic nevi   • Erectile dysfunction of non-organic origin   • Fatty liver disease, nonalcoholic   • Low HDL (under 40)   • Mild intermittent asthma with acute exacerbation   • Obesity due to excess calories, unspecified obesity severity   • EDMOND (obstructive sleep apnea)   • Low serum HDL   • Family history of stroke mother 62   • Lactose intolerance   • Recurrent cold sores   • Fear of needles   • Anxiety   • Reactive depression       PAST MEDICAL HISTORY:  Past Medical History:   Diagnosis Date   • Anxiety 2021   • Asthma    • Depression 2021   • Difficulty attaining erection    • Erectile dysfunction    • Fear of needles     pt will pass out in the presents of needles   • HTN (hypertension)    • Hypertension 2011   • Kidney disease    • Low HDL (under 40)     Last assessed 02/08/17   • Obesity    • Sleep apnea, obstructive    • Wheezing        PAST SURGICAL HISTORY:  Past Surgical History:   Procedure Laterality Date   • ROOT CANAL         FAMILY HISTORY:  Family History   Problem Relation Age of Onset   • Hypertension Mother    • Multiple sclerosis Mother    • Tremor Mother    • Gout Father    • Multiple sclerosis Brother    • Tremor Brother    • Multiple sclerosis Paternal Uncle    • Tremor Paternal Uncle    • Asthma Family    • Other Family         CVA   • Cancer Family        SOCIAL HISTORY:  Social History     Socioeconomic History   • Marital status: Single     Spouse name: Not on file   • Number of children: Not on file   • Years of education: Not on file   • Highest education level: Not on file   Occupational History   • Not on file   Tobacco Use   • Smoking status: Never   • Smokeless tobacco: Never   Vaping Use   • Vaping Use: Never used   Substance and Sexual Activity   • Alcohol use: No   • Drug use: No   • Sexual activity: Yes     Partners: Female   Other Topics Concern   • Not on file   Social History Narrative    Always uses seatbelt     Social Determinants of Health     Financial Resource Strain: Not on file   Food Insecurity: Not on file   Transportation Needs: Not on file   Physical Activity: Not on file   Stress: Not on file   Social Connections: Not on file   Intimate Partner Violence: Not on file   Housing Stability: Not on file       Review of Systems   Constitutional: Negative for chills and fever  HENT: Negative for ear pain and sore throat  Eyes: Negative for pain and visual disturbance  "  Respiratory: Negative for cough and shortness of breath  Cardiovascular: Negative for chest pain and palpitations  Gastrointestinal: Negative for abdominal pain and vomiting  Genitourinary: Negative for dysuria and hematuria  Musculoskeletal: Negative for arthralgias and back pain  Skin: Negative for color change and rash  Neurological: Negative for seizures and syncope  Psychiatric/Behavioral: Positive for sleep disturbance  The patient is nervous/anxious  All other systems reviewed and are negative  Objective:  Vitals:    06/19/23 0957 06/19/23 1018   BP: 140/80 118/80   BP Location: Left arm    Patient Position: Sitting    Cuff Size: Large    Pulse: 73    SpO2: 96%    Weight: 132 kg (291 lb 6 4 oz)    Height: 5' 11\" (1 803 m)      Body mass index is 40 64 kg/m²  Physical Exam  Vitals and nursing note reviewed  Constitutional:       Appearance: Normal appearance  He is obese  HENT:      Head: Normocephalic and atraumatic  Cardiovascular:      Rate and Rhythm: Normal rate and regular rhythm  Heart sounds: Normal heart sounds  Pulmonary:      Effort: Pulmonary effort is normal       Breath sounds: Normal breath sounds  Musculoskeletal:         General: Normal range of motion  Cervical back: Normal range of motion  Right lower leg: No edema  Left lower leg: No edema  Lymphadenopathy:      Cervical: No cervical adenopathy  Skin:     General: Skin is warm and dry  Neurological:      General: No focal deficit present  Mental Status: He is alert and oriented to person, place, and time  Mental status is at baseline  Psychiatric:         Mood and Affect: Mood normal          Behavior: Behavior normal            RESULTS:    In chart    This note was created with voice recognition software  Phonic, grammatical and spelling errors may be present within the note as a result      "

## 2023-07-20 DIAGNOSIS — J45.909 MODERATE ASTHMA WITHOUT COMPLICATION, UNSPECIFIED WHETHER PERSISTENT: ICD-10-CM

## 2023-07-20 RX ORDER — ALBUTEROL SULFATE 90 UG/1
AEROSOL, METERED RESPIRATORY (INHALATION)
Qty: 18 G | Refills: 1 | Status: SHIPPED | OUTPATIENT
Start: 2023-07-20

## 2023-09-18 DIAGNOSIS — J45.909 MODERATE ASTHMA WITHOUT COMPLICATION, UNSPECIFIED WHETHER PERSISTENT: ICD-10-CM

## 2023-09-18 RX ORDER — ALBUTEROL SULFATE 90 UG/1
1 AEROSOL, METERED RESPIRATORY (INHALATION) EVERY 6 HOURS PRN
Qty: 18 G | Refills: 1 | Status: SHIPPED | OUTPATIENT
Start: 2023-09-18

## 2023-10-25 ENCOUNTER — DOCUMENTATION (OUTPATIENT)
Dept: BEHAVIORAL/MENTAL HEALTH CLINIC | Facility: CLINIC | Age: 43
End: 2023-10-25

## 2023-10-25 DIAGNOSIS — F32.9 REACTIVE DEPRESSION: Primary | ICD-10-CM

## 2023-10-25 DIAGNOSIS — F41.9 ANXIETY: ICD-10-CM

## 2023-10-25 NOTE — PROGRESS NOTES
Psychotherapy Discharge Summary    Preferred Name: Sonia Orozco  YOB: 1980    Admission date to psychotherapy: 12/07/22    Referred by: Dr. Poly Landry    Presenting Problem: Reactive depression and anxiety due to recent break up with girlfriend    Course of treatment included : individual therapy     Progress/Outcome of Treatment Goals (brief summary of course of treatment) Dee Guevara was in therapy as he and his long term girlfriend had broken up and there was a struggle to let go,  He worked on boundary setting    Treatment Complications (if any): NA    Treatment Progress: fair    Current [facility] Psychiatric Provider: None    Discharge Medications include: See Chart    Discharge Date: 10/25/2023    Discharge Diagnosis:   1. Reactive depression        2.  Anxiety            Criteria for Discharge: demonstrated failure to uphold their treatment plan/contract    Aftercare recommendations include (include specific referral names and phone numbers, if appropriate): N/A    Prognosis: fair

## 2023-12-08 DIAGNOSIS — F41.9 ANXIETY: ICD-10-CM

## 2023-12-08 DIAGNOSIS — J45.909 MODERATE ASTHMA WITHOUT COMPLICATION, UNSPECIFIED WHETHER PERSISTENT: ICD-10-CM

## 2023-12-08 RX ORDER — ALBUTEROL SULFATE 90 UG/1
1 AEROSOL, METERED RESPIRATORY (INHALATION) EVERY 6 HOURS PRN
Qty: 18 G | Refills: 1 | Status: SHIPPED | OUTPATIENT
Start: 2023-12-08

## 2023-12-08 RX ORDER — ESCITALOPRAM OXALATE 10 MG/1
10 TABLET ORAL DAILY
Qty: 90 TABLET | Refills: 3 | Status: SHIPPED | OUTPATIENT
Start: 2023-12-08

## 2023-12-08 NOTE — TELEPHONE ENCOUNTER
Escitalopram 10 mg tablet-take 1 tablet by mouth daily    Albuterol 90 mcg/act inhaler-Inhale 1 every 6 hours as needed for wheezing    [facility]

## 2024-01-23 ENCOUNTER — AMB VIDEO VISIT (OUTPATIENT)
Dept: OTHER | Facility: HOSPITAL | Age: 44
End: 2024-01-23

## 2024-01-23 VITALS — RESPIRATION RATE: 17 BRPM

## 2024-01-23 DIAGNOSIS — H69.93 DYSFUNCTION OF BOTH EUSTACHIAN TUBES: Primary | ICD-10-CM

## 2024-01-23 PROCEDURE — ECARE PR SL URGENT CARE VIRTUAL VISIT: Performed by: PHYSICIAN ASSISTANT

## 2024-01-23 RX ORDER — PREDNISONE 20 MG/1
40 TABLET ORAL DAILY
Qty: 6 TABLET | Refills: 0 | Status: SHIPPED | OUTPATIENT
Start: 2024-01-23 | End: 2024-01-26

## 2024-01-23 NOTE — PATIENT INSTRUCTIONS
"As discussed, sinus infections are typically viral and will get better on their own in 7-10 days. You should try symptomatic relief in the meantime with OTC (Claritin or Zyrtec), Afrin up to 3 days then switch to Flonase, and Sudafed(behind the counter) and mucinex. You can also try sinus rinses with a neti pot or nasal lavage (be sure to use distilled water.) Sleep with a cool mist humidifier at your bedside. If your symptoms do not improve after 7-10 days, you may need antibiotics because it is more likely to be bacterial at that point.  Follow-up with your primary care physician for recheck in 2-3 business days. Go to the ER for sudden severe headache, high fevers, change in vision, seizure activity or anything else that is concerning.    [facility] Phone number is [phone] if you need assistance or have further questions  note in \"Letters\" section of [employer] gustavo. Can print if opened from a web browser  "

## 2024-01-23 NOTE — PROGRESS NOTES
Required Documentation:  Encounter provider Shannon D Severino, PA-C    Provider located at [facility]  VIRTUAL CARE   [address] [id number]    Identify all parties in room with patient during virtual visit:  No one else    The patient was identified by name and date of birth. Balbina Regalado was informed that this is a telemedicine visit and that the visit is being conducted through the [facility] [employer] platform. He agrees to proceed..  My office door was closed. No one else was in the room.  He acknowledged consent and understanding of privacy and security of the video platform. The patient has agreed to participate and understands they can discontinue the visit at any time.    Verification of patient location:    Patient is located at home in the following state in which I hold an active license [place]    Patient is aware this is a billable service.     Reason for visit is No chief complaint on file.       Subjective  HPI   Pt reports had COVID last week starting Tuesday or Wednesday. Was positive on Friday. Having persistent fatigue, occipital HA, slight cough, maxillary sinus congestion, R > L earaches. Tried taking sudafed, ibuprofen 200-400 mg, tylenol. No longer having fevers.      Past Medical History:   Diagnosis Date    Anxiety 2021    Asthma     Depression 2021    Difficulty attaining erection     Erectile dysfunction     Fear of needles     pt will pass out in the presents of needles    HTN (hypertension)     Hypertension 2011    Kidney disease     Low HDL (under 40)     Last assessed 02/08/17    Obesity     Sleep apnea, obstructive     Wheezing        Past Surgical History:   Procedure Laterality Date    ROOT CANAL          Allergies   Allergen Reactions    Dust Mite Extract     Molds & Smuts     Penicillins Other (See Comments)     Unknown childhood        Review of Systems   Constitutional:  Negative for fever.   HENT:  Positive for  congestion, ear pain and sinus pressure. Negative for nosebleeds.    Eyes:  Negative for redness.   Respiratory:  Positive for cough. Negative for shortness of breath.    Cardiovascular:  Negative for chest pain.   Gastrointestinal:  Negative for blood in stool.   Genitourinary:  Negative for hematuria.   Musculoskeletal:  Negative for gait problem.   Skin:  Negative for rash.   Neurological:  Negative for seizures.   Psychiatric/Behavioral:  Negative for behavioral problems.        Video Exam    Vitals:    01/23/24 1449   Resp: 17       Physical Exam  Constitutional:       General: He is not in acute distress.     Appearance: Normal appearance. He is not toxic-appearing.   HENT:      Head: Normocephalic and atraumatic.      Nose: No rhinorrhea.      Mouth/Throat:      Mouth: Mucous membranes are moist.   Eyes:      Conjunctiva/sclera: Conjunctivae normal.   Pulmonary:      Effort: Pulmonary effort is normal. No respiratory distress.      Breath sounds: No wheezing (no gross audible wheeze through computer).   Musculoskeletal:      Cervical back: Normal range of motion.   Skin:     Findings: No rash (on face or neck).   Neurological:      Mental Status: He is alert.      Cranial Nerves: No dysarthria or facial asymmetry.   Psychiatric:         Mood and Affect: Mood normal.         Behavior: Behavior normal.         Visit Time  Total Visit Duration: 19 minutes    Assessment/Plan:    Diagnoses and all orders for this visit:    Dysfunction of both eustachian tubes  -     predniSONE 20 mg tablet; Take 2 tablets (40 mg total) by mouth daily for 3 days        Patient Instructions   As discussed, sinus infections are typically viral and will get better on their own in 7-10 days. You should try symptomatic relief in the meantime with OTC (Claritin or Zyrtec), Afrin up to 3 days then switch to Flonase, and Sudafed(behind the counter) and mucinex. You can also try sinus rinses with a neti pot or nasal lavage (be sure to use  "distilled water.) Sleep with a cool mist humidifier at your bedside. If your symptoms do not improve after 7-10 days, you may need antibiotics because it is more likely to be bacterial at that point.  Follow-up with your primary care physician for recheck in 2-3 business days. Go to the ER for sudden severe headache, high fevers, change in vision, seizure activity or anything else that is concerning.    [facility] Phone number is [phone] if you need assistance or have further questions  note in \"Letters\" section of [employer] gustavo. Can print if opened from a web browser  "

## 2024-01-23 NOTE — [facility] EVISITS
Visit Summary for SAVANA OLIVER - Gender: Male - Date of Birth: 1980  Date: [id number] - Duration: 18 minutes  Patient: SAVANA OLIVER  Provider: Shannon Severino PA-C    Patient Contact Information  Address  [address] [place] [address]; [place] [address]  [phone]    Visit Topics  Headache [Added By: Self - 2024-01-23]  covid positive as of Friday based on home test. feeling better but I have an earache and congestion.  [Added By: Self - 2024-01-23]  Earache [Added By: Self - 2024-01-23]    Triage Questions   What is your current physical address in the event of a medical emergency? Answer []  Are you allergic to any medications? Answer []  Are you now or could you be pregnant? Answer []  Do you have any immune system compromise or chronic lung   disease? Answer []  Do you have any vulnerable family members in the home (infant, pregnant, cancer, elderly)? Answer []     Conversation Transcripts  [0A][0A] [Notification] You are connected with Shannon Severino PA-C, Urgent Care Specialist.[0A][Notification] SAVANA OLIVER is located in [place].[0A][Notification] SAVANA OLIVER has shared health history...[0A]    Diagnosis  Other specified disorders of Eustachian tube, bilateral    Procedures  Value: [id number] Code: CPT-4 UNLISTED E&M SERVICE    Medications Prescribed    No prescriptions ordered    Electronically signed by: Severino PA-C, Shannon(NPI [id number])

## 2024-01-24 DIAGNOSIS — J45.909 MODERATE ASTHMA WITHOUT COMPLICATION, UNSPECIFIED WHETHER PERSISTENT: ICD-10-CM

## 2024-01-24 RX ORDER — ALBUTEROL SULFATE 90 UG/1
1 AEROSOL, METERED RESPIRATORY (INHALATION) EVERY 6 HOURS PRN
Qty: 18 G | Refills: 2 | Status: SHIPPED | OUTPATIENT
Start: 2024-01-24

## 2024-05-24 ENCOUNTER — OFFICE VISIT (OUTPATIENT)
Dept: INTERNAL MEDICINE CLINIC | Facility: CLINIC | Age: 44
End: 2024-05-24
Payer: COMMERCIAL

## 2024-05-24 ENCOUNTER — TELEPHONE (OUTPATIENT)
Dept: INTERNAL MEDICINE CLINIC | Facility: CLINIC | Age: 44
End: 2024-05-24

## 2024-05-24 VITALS
HEART RATE: 82 BPM | WEIGHT: 290.4 LBS | TEMPERATURE: 99.6 F | HEIGHT: 71 IN | SYSTOLIC BLOOD PRESSURE: 128 MMHG | BODY MASS INDEX: 40.65 KG/M2 | RESPIRATION RATE: 20 BRPM | DIASTOLIC BLOOD PRESSURE: 96 MMHG | OXYGEN SATURATION: 96 %

## 2024-05-24 DIAGNOSIS — E66.09 OBESITY DUE TO EXCESS CALORIES, UNSPECIFIED OBESITY SEVERITY: ICD-10-CM

## 2024-05-24 DIAGNOSIS — J45.909 MODERATE ASTHMA WITHOUT COMPLICATION, UNSPECIFIED WHETHER PERSISTENT: ICD-10-CM

## 2024-05-24 DIAGNOSIS — R73.01 IMPAIRED FASTING BLOOD SUGAR: ICD-10-CM

## 2024-05-24 DIAGNOSIS — R10.31 RIGHT LOWER QUADRANT ABDOMINAL PAIN: Primary | ICD-10-CM

## 2024-05-24 DIAGNOSIS — M54.50 ACUTE BILATERAL LOW BACK PAIN WITHOUT SCIATICA: ICD-10-CM

## 2024-05-24 DIAGNOSIS — F52.21 ERECTILE DYSFUNCTION OF NON-ORGANIC ORIGIN: ICD-10-CM

## 2024-05-24 DIAGNOSIS — F41.9 ANXIETY: ICD-10-CM

## 2024-05-24 DIAGNOSIS — K76.0 FATTY LIVER DISEASE, NONALCOHOLIC: ICD-10-CM

## 2024-05-24 DIAGNOSIS — F40.298 NEEDLE PHOBIA: Primary | ICD-10-CM

## 2024-05-24 DIAGNOSIS — R74.8 LOW SERUM HDL: ICD-10-CM

## 2024-05-24 PROBLEM — I10 BENIGN ESSENTIAL HYPERTENSION: Status: ACTIVE | Noted: 2024-05-24

## 2024-05-24 PROCEDURE — 99214 OFFICE O/P EST MOD 30 MIN: CPT | Performed by: INTERNAL MEDICINE

## 2024-05-24 RX ORDER — ALBUTEROL SULFATE 90 UG/1
1 AEROSOL, METERED RESPIRATORY (INHALATION) EVERY 6 HOURS PRN
Qty: 18 G | Refills: 2 | Status: SHIPPED | OUTPATIENT
Start: 2024-05-24

## 2024-05-24 RX ORDER — ESCITALOPRAM OXALATE 5 MG/1
5 TABLET ORAL DAILY
Qty: 30 TABLET | Refills: 5 | Status: SHIPPED | OUTPATIENT
Start: 2024-05-24

## 2024-05-24 RX ORDER — TADALAFIL 20 MG/1
20 TABLET ORAL DAILY PRN
Qty: 10 TABLET | Refills: 0 | Status: SHIPPED | OUTPATIENT
Start: 2024-05-24

## 2024-05-24 RX ORDER — MELOXICAM 15 MG/1
TABLET ORAL
Start: 2024-05-24

## 2024-05-24 RX ORDER — DIAZEPAM 5 MG/1
5 TABLET ORAL ONCE
Qty: 1 TABLET | Refills: 0 | Status: SHIPPED | OUTPATIENT
Start: 2024-05-24 | End: 2024-05-24

## 2024-05-24 NOTE — TELEPHONE ENCOUNTER
Patient forgot to ask you at his visit if you could give him a script for Valium? Patient said you give him this when he has to have blood work done.    Please advise......

## 2024-05-24 NOTE — PROGRESS NOTES
Assessment/Plan:     Here with few concerns. Ordered labs including cbc CMP TSH A1c lipid.  Sent Valium to help with anxiety before we will draw.    He reports chronic right lower abdominal pain, cramp, kind of pain, x years, denies n/v/d/c, BM regular, drinking water helps the pain, h/o IBS, changing position helps. H/o fatty liver, check US; exam is normal.    History of ED.  Takes sildenafil Cialis as needed.    History of obstructive sleep apnea.  Needs to obtain a new mask.    Asthma is controlled.  Refills for albuterol sent.    Fatty liver.  Was taking vitamin D in the past.  Obtain ultrasound.    Anxiety is better controlled and he wants to come off Lexapro.  Start 5 mg daily for few weeks, then 5 mg every other day for a few weeks.  Then stop    Discussed his obesity, discussed exercise lifestyle changes.  Increasing protein in the diet.  Weight management ordered.    Quality Measures:       Depression Screening and Follow-up Plan: Patient was screened for depression during today's encounter. They screened negative with a PHQ-9 score of 0.       Return in about 3 months (around 8/24/2024) for Annual physical.    No problem-specific Assessment & Plan notes found for this encounter.       Diagnoses and all orders for this visit:    Right lower quadrant abdominal pain  -     US right upper quadrant; Future  -     CBC and differential; Future    Fatty liver disease, nonalcoholic  -     US right upper quadrant; Future  -     CBC and differential; Future  -     Comprehensive metabolic panel; Future    Anxiety  -     TSH, 3rd generation with Free T4 reflex; Future  -     escitalopram (LEXAPRO) 5 mg tablet; Take 1 tablet (5 mg total) by mouth daily    Low serum HDL  -     Lipid Panel with Direct LDL reflex; Future    Impaired fasting blood sugar  -     Hemoglobin A1C; Future    Moderate asthma without complication, unspecified whether persistent  -     albuterol (PROVENTIL HFA,VENTOLIN HFA) 90 mcg/act inhaler;  Inhale 1 puff every 6 (six) hours as needed for wheezing    Acute bilateral low back pain without sciatica  -     meloxicam (MOBIC) 15 mg tablet; PRN FOR BACK PAIN  -     MRI lumbar spine wo contrast; Future    Erectile dysfunction of non-organic origin  -     tadalafil (CIALIS) 20 MG tablet; Take 1 tablet (20 mg total) by mouth daily as needed for erectile dysfunction PRN    Obesity due to excess calories, unspecified obesity severity  -     Ambulatory Referral to Weight Management; Future          Subjective:      Patient ID: Balbina Regalado is a 43 y.o. male.    Abdominal Pain  This is a new problem. Associated symptoms include arthralgias. Pertinent negatives include no dysuria, fever, hematuria or vomiting.       ALLERGIES:  Allergies   Allergen Reactions   • Dust Mite Extract    • Molds & Smuts    • Penicillins Other (See Comments)     Unknown childhood        CURRENT MEDICATIONS:    Current Outpatient Medications:   •  acyclovir (ZOVIRAX) 5 % ointment, Every 6 hours as needed, Disp: 15 g, Rfl: 1  •  albuterol (PROVENTIL HFA,VENTOLIN HFA) 90 mcg/act inhaler, Inhale 1 puff every 6 (six) hours as needed for wheezing, Disp: 18 g, Rfl: 2  •  Cholecalciferol (VITAMIN D3) 2000 units capsule, Take 1 capsule by mouth daily, Disp: , Rfl:   •  Digestive Enzymes (DIGESTIVE ENZYME PO), Take by mouth, Disp: , Rfl:   •  escitalopram (LEXAPRO) 5 mg tablet, Take 1 tablet (5 mg total) by mouth daily, Disp: 30 tablet, Rfl: 5  •  meloxicam (MOBIC) 15 mg tablet, PRN FOR BACK PAIN, Disp: , Rfl:   •  sildenafil (VIAGRA) 100 mg tablet, Take 1 tablet (100 mg total) by mouth daily as needed for erectile dysfunction (Patient taking differently: Take 100 mg by mouth daily as needed for erectile dysfunction PRN), Disp: 10 tablet, Rfl: 11  •  tadalafil (CIALIS) 20 MG tablet, Take 1 tablet (20 mg total) by mouth daily as needed for erectile dysfunction PRN, Disp: 10 tablet, Rfl: 0    ACTIVE PROBLEM LIST:  Patient Active Problem List    Diagnosis   • Dysplastic nevi   • Erectile dysfunction of non-organic origin   • Fatty liver disease, nonalcoholic   • Low HDL (under 40)   • Mild intermittent asthma with acute exacerbation   • Obesity due to excess calories, unspecified obesity severity   • EDMOND (obstructive sleep apnea)   • Low serum HDL   • Family history of stroke mother 57   • Lactose intolerance   • Recurrent cold sores   • Fear of needles   • Anxiety   • Reactive depression   • Asthma   • Benign essential hypertension       PAST MEDICAL HISTORY:  Past Medical History:   Diagnosis Date   • Anxiety 2021   • Asthma    • Depression 2021   • Difficulty attaining erection    • Erectile dysfunction    • Fear of needles     pt will pass out in the presents of needles   • HTN (hypertension)    • Hypertension 2011   • Kidney disease    • Low HDL (under 40)     Last assessed 02/08/17   • Obesity    • Sleep apnea, obstructive    • Wheezing        PAST SURGICAL HISTORY:  Past Surgical History:   Procedure Laterality Date   • ROOT CANAL         FAMILY HISTORY:  Family History   Problem Relation Age of Onset   • Hypertension Mother    • Multiple sclerosis Mother    • Tremor Mother    • Gout Father    • Multiple sclerosis Brother    • Tremor Brother    • Multiple sclerosis Paternal Uncle    • Tremor Paternal Uncle    • Asthma Family    • Other Family         CVA   • Cancer Family        SOCIAL HISTORY:  Social History     Socioeconomic History   • Marital status: Single     Spouse name: Not on file   • Number of children: Not on file   • Years of education: Not on file   • Highest education level: Not on file   Occupational History   • Not on file   Tobacco Use   • Smoking status: Never   • Smokeless tobacco: Never   Vaping Use   • Vaping status: Never Used   Substance and Sexual Activity   • Alcohol use: No   • Drug use: No   • Sexual activity: Yes     Partners: Female   Other Topics Concern   • Not on file   Social History Narrative    Always uses  "seatbelt     Social Determinants of Health     Financial Resource Strain: Not on file   Food Insecurity: Not on file   Transportation Needs: Not on file   Physical Activity: Not on file   Stress: Not on file   Social Connections: Not on file   Intimate Partner Violence: Not on file   Housing Stability: Not on file       Review of Systems   Constitutional:  Negative for chills and fever.   HENT:  Negative for ear pain and sore throat.    Eyes:  Negative for pain and visual disturbance.   Respiratory:  Negative for cough and shortness of breath.    Cardiovascular:  Negative for chest pain and palpitations.   Gastrointestinal:  Positive for abdominal pain. Negative for vomiting.   Genitourinary:  Negative for dysuria and hematuria.   Musculoskeletal:  Positive for arthralgias and back pain.   Skin:  Negative for color change and rash.   Neurological:  Negative for seizures and syncope.   Psychiatric/Behavioral:  The patient is nervous/anxious.    All other systems reviewed and are negative.        Objective:  Vitals:    05/24/24 1246   BP: 128/96   BP Location: Left arm   Patient Position: Sitting   Cuff Size: Standard   Pulse: 82   Resp: 20   Temp: 99.6 °F (37.6 °C)   TempSrc: Tympanic   SpO2: 96%   Weight: 132 kg (290 lb 6.4 oz)   Height: 5' 11\" (1.803 m)     Body mass index is 40.5 kg/m².     Physical Exam  Vitals and nursing note reviewed.   Constitutional:       Appearance: Normal appearance. He is well-developed. He is obese.   HENT:      Head: Normocephalic and atraumatic.   Cardiovascular:      Rate and Rhythm: Normal rate and regular rhythm.   Pulmonary:      Effort: Pulmonary effort is normal.      Breath sounds: Normal breath sounds.   Abdominal:      General: Abdomen is flat. Bowel sounds are normal.      Palpations: Abdomen is soft.      Tenderness: There is no abdominal tenderness.   Musculoskeletal:         General: Normal range of motion.      Cervical back: Normal range of motion.   Skin:     General: " Skin is warm and dry.   Neurological:      General: No focal deficit present.      Mental Status: He is alert and oriented to person, place, and time. Mental status is at baseline.   Psychiatric:         Mood and Affect: Mood normal.         RESULTS:  Hemoglobin A1C   Date/Time Value Ref Range Status   09/22/2022 08:47 AM 5.3 Normal 3.8-5.6%; PreDiabetic 5.7-6.4%; Diabetic >=6.5%; Glycemic control for adults with diabetes <7.0% % Final     Hemoglobin   Date/Time Value Ref Range Status   09/22/2022 08:47 AM 16.6 12.0 - 17.0 g/dL Final     Hematocrit   Date/Time Value Ref Range Status   09/22/2022 08:47 AM 48.3 36.5 - 49.3 % Final     Platelets   Date/Time Value Ref Range Status   09/22/2022 08:47  149 - 390 Thousands/uL Final     PSA   Date/Time Value Ref Range Status   09/22/2022 08:47 AM 0.5 0.0 - 4.0 ng/mL Final     Comment:     American Urological Association Guidelines define biochemical recurrence of prostate cancer as a detectable or rising PSA value post-radical prostatectomy that is greater than or equal to 0.2 ng/mL with a second confirmatory level of greater than or equal to 0.2 ng/mL.     TSH 3RD GENERATON   Date/Time Value Ref Range Status   09/22/2022 08:47 AM 1.250 0.450 - 4.500 uIU/mL Final     Comment:     Adult TSH (3rd generation) reference range follows the recommended guidelines of the American Thyroid Association, January, 2020.     Sodium   Date/Time Value Ref Range Status   09/22/2022 08:47  135 - 147 mmol/L Final     BUN   Date/Time Value Ref Range Status   09/22/2022 08:47 AM 16 5 - 25 mg/dL Final     Creatinine   Date/Time Value Ref Range Status   09/22/2022 08:47 AM 1.16 0.60 - 1.30 mg/dL Final     Comment:     Standardized to IDMS reference method      In chart    This note was created with voice recognition software.  Phonic, grammatical and spelling errors may be present within the note as a result.

## 2024-06-10 ENCOUNTER — TELEPHONE (OUTPATIENT)
Dept: INTERNAL MEDICINE CLINIC | Facility: CLINIC | Age: 44
End: 2024-06-10

## 2024-06-10 DIAGNOSIS — M54.50 ACUTE BILATERAL LOW BACK PAIN WITHOUT SCIATICA: Primary | ICD-10-CM

## 2024-06-10 NOTE — TELEPHONE ENCOUNTER
Pt was called and informed of below. He is agreeable to an XRAY but at this time he would like to hold off on PT as he is trying to focus on losing weight.     Pt would like xray ordered.     Order pending please review

## 2024-06-10 NOTE — TELEPHONE ENCOUNTER
----- Message from Ayad Lam MD sent at 6/10/2024  9:41 AM EDT -----  Regarding: mri is canceled  Let pt know mri is not approved by insurance    Need to start with xray and pt

## 2024-06-14 ENCOUNTER — APPOINTMENT (OUTPATIENT)
Dept: LAB | Facility: HOSPITAL | Age: 44
End: 2024-06-14
Attending: INTERNAL MEDICINE
Payer: COMMERCIAL

## 2024-06-14 ENCOUNTER — HOSPITAL ENCOUNTER (OUTPATIENT)
Dept: MRI IMAGING | Facility: HOSPITAL | Age: 44
End: 2024-06-14
Attending: INTERNAL MEDICINE
Payer: COMMERCIAL

## 2024-06-14 ENCOUNTER — HOSPITAL ENCOUNTER (OUTPATIENT)
Dept: RADIOLOGY | Facility: HOSPITAL | Age: 44
End: 2024-06-14
Payer: COMMERCIAL

## 2024-06-14 ENCOUNTER — HOSPITAL ENCOUNTER (OUTPATIENT)
Dept: ULTRASOUND IMAGING | Facility: HOSPITAL | Age: 44
End: 2024-06-14
Attending: INTERNAL MEDICINE
Payer: COMMERCIAL

## 2024-06-14 DIAGNOSIS — M54.50 ACUTE BILATERAL LOW BACK PAIN WITHOUT SCIATICA: ICD-10-CM

## 2024-06-14 DIAGNOSIS — F41.9 ANXIETY: ICD-10-CM

## 2024-06-14 DIAGNOSIS — R10.31 RIGHT LOWER QUADRANT ABDOMINAL PAIN: ICD-10-CM

## 2024-06-14 DIAGNOSIS — R74.8 LOW SERUM HDL: ICD-10-CM

## 2024-06-14 DIAGNOSIS — J45.909 MODERATE ASTHMA WITHOUT COMPLICATION, UNSPECIFIED WHETHER PERSISTENT: ICD-10-CM

## 2024-06-14 DIAGNOSIS — G47.33 OSA (OBSTRUCTIVE SLEEP APNEA): ICD-10-CM

## 2024-06-14 DIAGNOSIS — E78.6 LOW HDL (UNDER 40): ICD-10-CM

## 2024-06-14 DIAGNOSIS — K76.0 FATTY LIVER DISEASE, NONALCOHOLIC: ICD-10-CM

## 2024-06-14 DIAGNOSIS — R73.01 IMPAIRED FASTING BLOOD SUGAR: ICD-10-CM

## 2024-06-14 LAB
ALBUMIN SERPL BCP-MCNC: 4.7 G/DL (ref 3.5–5)
ALP SERPL-CCNC: 40 U/L (ref 34–104)
ALT SERPL W P-5'-P-CCNC: 65 U/L (ref 7–52)
ANION GAP SERPL CALCULATED.3IONS-SCNC: 7 MMOL/L (ref 4–13)
AST SERPL W P-5'-P-CCNC: 39 U/L (ref 13–39)
BASOPHILS # BLD AUTO: 0.07 THOUSANDS/ÂΜL (ref 0–0.1)
BASOPHILS NFR BLD AUTO: 1 % (ref 0–1)
BILIRUB SERPL-MCNC: 0.82 MG/DL (ref 0.2–1)
BUN SERPL-MCNC: 15 MG/DL (ref 5–25)
CALCIUM SERPL-MCNC: 9.7 MG/DL (ref 8.4–10.2)
CHLORIDE SERPL-SCNC: 106 MMOL/L (ref 96–108)
CHOLEST SERPL-MCNC: 165 MG/DL
CO2 SERPL-SCNC: 26 MMOL/L (ref 21–32)
CREAT SERPL-MCNC: 1.25 MG/DL (ref 0.6–1.3)
EOSINOPHIL # BLD AUTO: 0.13 THOUSAND/ÂΜL (ref 0–0.61)
EOSINOPHIL NFR BLD AUTO: 2 % (ref 0–6)
ERYTHROCYTE [DISTWIDTH] IN BLOOD BY AUTOMATED COUNT: 12.2 % (ref 11.6–15.1)
EST. AVERAGE GLUCOSE BLD GHB EST-MCNC: 103 MG/DL
GFR SERPL CREATININE-BSD FRML MDRD: 70 ML/MIN/1.73SQ M
GLUCOSE P FAST SERPL-MCNC: 96 MG/DL (ref 65–99)
HBA1C MFR BLD: 5.2 %
HCT VFR BLD AUTO: 47 % (ref 36.5–49.3)
HDLC SERPL-MCNC: 31 MG/DL
HGB BLD-MCNC: 16 G/DL (ref 12–17)
IMM GRANULOCYTES # BLD AUTO: 0.02 THOUSAND/UL (ref 0–0.2)
IMM GRANULOCYTES NFR BLD AUTO: 0 % (ref 0–2)
LDLC SERPL CALC-MCNC: 115 MG/DL (ref 0–100)
LYMPHOCYTES # BLD AUTO: 1.87 THOUSANDS/ÂΜL (ref 0.6–4.47)
LYMPHOCYTES NFR BLD AUTO: 33 % (ref 14–44)
MCH RBC QN AUTO: 31.1 PG (ref 26.8–34.3)
MCHC RBC AUTO-ENTMCNC: 34 G/DL (ref 31.4–37.4)
MCV RBC AUTO: 91 FL (ref 82–98)
MONOCYTES # BLD AUTO: 0.49 THOUSAND/ÂΜL (ref 0.17–1.22)
MONOCYTES NFR BLD AUTO: 9 % (ref 4–12)
NEUTROPHILS # BLD AUTO: 3.16 THOUSANDS/ÂΜL (ref 1.85–7.62)
NEUTS SEG NFR BLD AUTO: 55 % (ref 43–75)
NRBC BLD AUTO-RTO: 0 /100 WBCS
PLATELET # BLD AUTO: 194 THOUSANDS/UL (ref 149–390)
PMV BLD AUTO: 9.5 FL (ref 8.9–12.7)
POTASSIUM SERPL-SCNC: 4.3 MMOL/L (ref 3.5–5.3)
PROT SERPL-MCNC: 7.5 G/DL (ref 6.4–8.4)
RBC # BLD AUTO: 5.15 MILLION/UL (ref 3.88–5.62)
SODIUM SERPL-SCNC: 139 MMOL/L (ref 135–147)
TRIGL SERPL-MCNC: 96 MG/DL
TSH SERPL DL<=0.05 MIU/L-ACNC: 1.71 UIU/ML (ref 0.45–4.5)
WBC # BLD AUTO: 5.74 THOUSAND/UL (ref 4.31–10.16)

## 2024-06-14 PROCEDURE — 85025 COMPLETE CBC W/AUTO DIFF WBC: CPT

## 2024-06-14 PROCEDURE — 72110 X-RAY EXAM L-2 SPINE 4/>VWS: CPT

## 2024-06-14 PROCEDURE — 84443 ASSAY THYROID STIM HORMONE: CPT

## 2024-06-14 PROCEDURE — 76705 ECHO EXAM OF ABDOMEN: CPT

## 2024-06-14 PROCEDURE — 80061 LIPID PANEL: CPT

## 2024-06-14 PROCEDURE — 83036 HEMOGLOBIN GLYCOSYLATED A1C: CPT

## 2024-06-14 PROCEDURE — 36415 COLL VENOUS BLD VENIPUNCTURE: CPT

## 2024-06-14 PROCEDURE — 80053 COMPREHEN METABOLIC PANEL: CPT

## 2024-06-15 DIAGNOSIS — F41.9 ANXIETY: ICD-10-CM

## 2024-06-15 RX ORDER — ESCITALOPRAM OXALATE 5 MG/1
5 TABLET ORAL DAILY
Qty: 90 TABLET | Refills: 1 | Status: SHIPPED | OUTPATIENT
Start: 2024-06-15

## 2024-06-25 ENCOUNTER — TELEPHONE (OUTPATIENT)
Age: 44
End: 2024-06-25

## 2024-06-25 NOTE — TELEPHONE ENCOUNTER
Pt is expecting a call from the office. He doesn't know if he should be coming in at 9;30 or 8:15 for his appt on 6/28. He's saying you all can reach out via my chart.

## 2024-06-28 ENCOUNTER — OFFICE VISIT (OUTPATIENT)
Dept: BARIATRICS | Facility: CLINIC | Age: 44
End: 2024-06-28
Payer: COMMERCIAL

## 2024-06-28 VITALS
SYSTOLIC BLOOD PRESSURE: 128 MMHG | HEIGHT: 71 IN | DIASTOLIC BLOOD PRESSURE: 89 MMHG | HEART RATE: 73 BPM | WEIGHT: 282 LBS | BODY MASS INDEX: 39.48 KG/M2

## 2024-06-28 DIAGNOSIS — E66.9 OBESITY, CLASS II, BMI 35-39.9: Primary | ICD-10-CM

## 2024-06-28 PROBLEM — E66.812 OBESITY, CLASS II, BMI 35-39.9: Status: ACTIVE | Noted: 2024-06-28

## 2024-06-28 PROCEDURE — 99204 OFFICE O/P NEW MOD 45 MIN: CPT | Performed by: INTERNAL MEDICINE

## 2024-06-28 RX ORDER — METFORMIN HYDROCHLORIDE 500 MG/1
TABLET, EXTENDED RELEASE ORAL
Qty: 60 TABLET | Refills: 3 | Status: SHIPPED | OUTPATIENT
Start: 2024-06-28

## 2024-06-28 NOTE — ASSESSMENT & PLAN NOTE
Nutrition: Congratulated patient on making healthy changes over the last 2 months  -Advised the patient to not skip meals especially breakfast and start the day with a protein rich breakfast  - incorporate protein and fiber based foods with meals and snacks for satiety and metabolic effect  -Consider protein supplements in order to meet protein goals  -Patient agreeable to working with the dietitian and the conservative program  -Patient reports that he has been drinking black coffee for history of NAFLD and cut out sugary drinks    Physical Activity: Has been doing cardio workouts on treadmill and elliptical in the gym 4-5 times a week; encouraged introduction of resistance training with rotating muscle groups and progression of weights 2 to 3 days a week.  Given information regarding [employer] fitness program    Sleep: -EDMOND on CPAP needs a new mask ; 7 hours     Behavioral/Stress: Start tracking current intake using MFP, so adjustments can be made by the dietitian.  -Patient has anxiety surrounding needles and had to be placed on Valium to get blood work; he is reports that this is getting better  -Primarily describes cravings in the evening and eating late at night; counseled the patient to have a cut off time after which no food would be consumed.  -Incorporating adequate protein could help mitigate some of these cravings  -    Antiobesity Medications/Medical --class III obesity 40 with NAFLD  -Patient has anxiety with needles which is improving so would like to avoid Zepbound and Wegovy which could be considered in the future    Oral medication options were discussed:  -We decided to initiate metformin titrated up to 1 g  -Other oral medication options include Topamax-could be considered for his cravings in the evening  - Wellbutrin naltrexone-will monitor anxiety if we consider Wellbutrin  -Patient is requested to inquire with his insurance regarding coverage for Contrave  -Phentermine/Qsymia if considered would  be a last option due to potential of worsening underlying anxiety

## 2024-06-28 NOTE — PROGRESS NOTES
Assessment/Plan     Balbina Regalado is 43 y.o. year old male  who comes in for consultation for assistance with weight management.     - Discussed options of [facility]-Intensive Lifestyle Intervention Program, Very Low Calorie Diet-VLCD, and Conservative Program and the role of weight loss medications.  - Patient is interested in pursuing Conservative Program    Obesity, Class II, BMI 35-39.9  Nutrition: Congratulated patient on making healthy changes over the last 2 months  -Advised the patient to not skip meals especially breakfast and start the day with a protein rich breakfast  - incorporate protein and fiber based foods with meals and snacks for satiety and metabolic effect  -Consider protein supplements in order to meet protein goals  -Patient agreeable to working with the dietitian and the conservative program  -Patient reports that he has been drinking black coffee for history of NAFLD and cut out sugary drinks    Physical Activity: Has been doing cardio workouts on treadmill and elliptical in the gym 4-5 times a week; encouraged introduction of resistance training with rotating muscle groups and progression of weights 2 to 3 days a week.  Given information regarding [employer] fitness program    Sleep: -EDMOND on CPAP needs a new mask ; 7 hours     Behavioral/Stress: Start tracking current intake using MFP, so adjustments can be made by the dietitian.  -Patient has anxiety surrounding needles and had to be placed on Valium to get blood work; he is reports that this is getting better  -Primarily describes cravings in the evening and eating late at night; counseled the patient to have a cut off time after which no food would be consumed.  -Incorporating adequate protein could help mitigate some of these cravings  -    Antiobesity Medications/Medical --class III obesity 40 with NAFLD  -Patient has anxiety with needles which is improving so would like to avoid Zepbound and Wegovy which could be considered in the  future    Oral medication options were discussed:  -We decided to initiate metformin titrated up to 1 g  -Other oral medication options include Topamax-could be considered for his cravings in the evening  - Wellbutrin naltrexone-will monitor anxiety if we consider Wellbutrin  -Patient is requested to inquire with his insurance regarding coverage for Contrave  -Phentermine/Qsymia if considered would be a last option due to potential of worsening underlying anxiety    CRISTO was seen today for consult.    Diagnoses and all orders for this visit:    Obesity, Class II, BMI 35-39.9  -     metFORMIN (GLUCOPHAGE-XR) 500 mg 24 hr tablet; Start with 500 mg orally once daily with the evening meal increase in two weeks to 1000 mg (two tablets) if tolerated          -In addition, please follow general recommendations below.          Return visit:  6-8 weeks        General Lifestyle recommendations:    Nutrition   -Avoid skipping meals. Avoid sugary beverages. At least 64oz of water daily.  Limit processed food, refined sugars and grain. Encourage  healthy choices for meals and snacks   -Focus on protein goals and non starchy fiber rich vegetables for satiety effect and to help support a calorie deficit.   - Emphasize portion control, well balanced macronutrient's (protein, carbohydrate, fat using My[employer] method )and adequate protein with each meal/snacks and distributing calories equally throughout the day along with.   -Advise starting the day with a protein breakfast   Behavioral/Stress   Food log via gustavo or provided paper log (gusatvo options include www.Workubepal.com, sparkpeople.com, loseit.com, calorieking.com, "[employer] Encouraged mindful eating. Be sure to set aside time to eat, eat slowly, and savor your food. Consider meditation apps and/or taking a few minutes of mindfulness every AM. Understand the role of regarding the role of stress hormone cortisol in promoting weight gain and visceral fat accumulation. Weigh daily  "or atleast 2-3 times/ week  Physical Activity   Increase physical activity by 10 minutes daily. Gradually increase physical activity to a goal of 5 days per week for 30 minutes of MODERATE intensity ( should be able to pass the \"talk test\" but should not be able to sing. Target 150-300 minutes  PLUS 2 days per week of FULL BODY resistance training. Progression will be addressed at follow up visits. Encouraged contemplation regarding establishing a daily physical activity routine  - Resistance training along with increase protein intake is important to maintain and enhance metabolism  Sleep   Encourage sleep hygiene and importance of having adequate sleep duration at least > 6 hours to support response in weight loss efforts    Handouts provided :  [facility] program at [facility]  My[employer] and food quality  Food log resources, phone gustavo or paper journal  Antiobesity medications options     - Discussed at length and the role of weight loss medications and medication options   - Explained the importance of making lifestyle changes in addition to starting any anti-obesity medications if the  patient chooses.  -  Initial weight loss goal of 5-10% weight loss for improved health  - Weight loss can improve patient's co-morbid conditions and/or prevent weight-related complications.  - Weight is not at goal and patient has been unable to achieve a meaningful weight loss above 5% using various programs and tools for more than 6 months    CRISTO was seen today for consult.    Diagnoses and all orders for this visit:    Obesity, Class II, BMI 35-39.9  -     metFORMIN (GLUCOPHAGE-XR) 500 mg 24 hr tablet; Start with 500 mg orally once daily with the evening meal increase in two weeks to 1000 mg (two tablets) if tolerated                      Total time spent reviewing chart, interviewing patient, examining patient, discussing plan, answering all questions, and documentin min, with >50% face-to-face time spent counseling patient " on nonsurgical interventions for the treatment of excess weight. Discussed in detail nonsurgical options including intensive lifestyle intervention program, very low-calorie diet program and conservative program.  Discussed the role of weight loss medications.  Counseled patient on diet behavior and exercise modification for weight loss.            Lifestyle questionnaire     Last 2 months made a lot of changes but before that sugary cereals and fast food and sugar loaded coffee  Diet recall:  B: skips  L: 11:AM yogurt  or granola bar or fruit  D: salad chicken  S: bigger issue fruits and granola bars some dessert  Eating out vs cooking at home- 1-2 x/ week    Beverages  Water--  90 +   Caffeine/tea--caramel machioatto before and now black coffee 20 oz     SSB- was drinking a lots of juice previously and currently only drinks an occasional soda regular     Alcohol: no  Smoking: no  Drug use: no    Physical Activity -- couple months treadmill in gym 4-5 times a week    Sleep --EDMOND on CPAP needs a new mask ; 7 hours     Occupation-3 WFH 2 days in office, techni    Psycho social- lives with inessa; anxiety with needles          Start date: 6/28/2024  Intial weight : 282 lbs  Intial BMI: 40 kg/m2  Obesity Class: Above 40- Obesity Class III  Goal weight: 220 or less lbs          History of present illness       Onset--has had lifelong struggles with weight; 180 pounds at age 13 years high 200s is his stable weight  COVID lost weight     Went through a break up and gained weight last year reports he is back together with his lenka    Fam hx of obesity-dad, grandfather    Food behaviorsstress/emotional eating, boredom eating, snacking/grazing, and struggle controlling portions-cravings in the evening    Previous Weight loss medications/Weight loss attempts:   Last two months has made a lot of changes to his diet,  -During COVID lost weight 210 lbs self-directed lifestyle changes--> kept it off a year and half   Patient  reports and other history-    Referred by PCP  Wt Readings from Last 20 Encounters:   06/28/24 128 kg (282 lb)   05/24/24 132 kg (290 lb 6.4 oz)   06/19/23 132 kg (291 lb 6.4 oz)   02/23/23 124 kg (273 lb 12.8 oz)   12/20/22 117 kg (258 lb 9.6 oz)   11/02/22 112 kg (248 lb)   10/31/22 112 kg (248 lb)   09/28/22 106 kg (234 lb)   08/12/22 110 kg (243 lb)   08/08/22 109 kg (241 lb 3.2 oz)   10/11/21 101 kg (223 lb)   12/02/20 99.3 kg (219 lb)   10/30/19 121 kg (266 lb 12.8 oz)   09/11/19 118 kg (260 lb)   08/07/19 119 kg (262 lb 9.6 oz)   09/12/18 124 kg (274 lb 6.4 oz)   09/06/18 124 kg (274 lb)   08/27/18 123 kg (272 lb)   08/17/18 120 kg (265 lb)   08/10/18 120 kg (264 lb)           Medication considerations/contraindications     -Patient denies personal history of pancreatitis. Patient also denies personal and family history of medullary thyroid cancer and multiple endocrine neoplasia type 2 (MEN 2 tumor). -Patient denies any history of kidney stones, seizures, or glaucoma, diabetic retinopathy, gall bladder disease, hyperthyroidism.  -Denies Hx of CAD, PAD, palpitations, arrhythmia.   -Denies uncontrolled anxiety or depression, suicidal behavior or thinking , insomnia or sleep disturbance.         Past medical history/past surgical history       Previous notes and records have been reviewed.    The following portions of the patient's history were reviewed and updated as appropriate: allergies, current medications, past family history, past medical history, past social history, past surgical history, and problem list.    Past Medical History:   Diagnosis Date    Anxiety 2021    Asthma     Depression 2021    Difficulty attaining erection     Erectile dysfunction     Fear of needles     pt will pass out in the presents of needles    HTN (hypertension)     Hypertension 2011    Kidney disease     Low HDL (under 40)     Last assessed 02/08/17    Obesity     Sleep apnea, obstructive     Wheezing          Past Surgical  "History:   Procedure Laterality Date    ROOT CANAL               Family History   Problem Relation Age of Onset    Hypertension Mother     Multiple sclerosis Mother     Tremor Mother     Gout Father     Multiple sclerosis Brother     Tremor Brother     Multiple sclerosis Paternal Uncle     Tremor Paternal Uncle     Asthma Family     Other Family         CVA    Cancer Family             Objective     /89 (BP Location: Left arm, Patient Position: Sitting, Cuff Size: Large)   Pulse 73   Ht 5' 10.5\" (1.791 m)   Wt 128 kg (282 lb)   BMI 39.89 kg/m²       Review of Systems   Constitutional:  Negative for fatigue.   HENT:  Negative for sore throat.    Respiratory:  Negative for cough and shortness of breath.    Cardiovascular:  Negative for chest pain, palpitations and leg swelling.   Gastrointestinal:  Negative for abdominal pain, constipation, diarrhea and nausea.   Genitourinary:  Negative for dysuria.   Musculoskeletal:  Negative for arthralgias and back pain.   Skin:  Negative for rash.   Neurological:  Negative for headaches.   Psychiatric/Behavioral:  Negative for dysphoric mood. The patient is not nervous/anxious.        Physical Exam  Vitals and nursing note reviewed.   Constitutional:       Appearance: Normal appearance.   HENT:      Head: Normocephalic.   Neck:      Thyroid: No thyroid mass, thyromegaly or thyroid tenderness.   Cardiovascular:      Rate and Rhythm: Normal rate and regular rhythm.      Pulses: Normal pulses.      Heart sounds: Normal heart sounds.   Pulmonary:      Effort: Pulmonary effort is normal.      Breath sounds: Normal breath sounds.   Abdominal:      General: Abdomen is flat.      Palpations: Abdomen is soft.   Musculoskeletal:      Cervical back: Normal range of motion and neck supple. No tenderness.   Skin:     General: Skin is warm and dry.   Neurological:      General: No focal deficit present.      Mental Status: He is alert and oriented to person, place, and time. "   Psychiatric:         Mood and Affect: Mood normal.         Behavior: Behavior normal.         Thought Content: Thought content normal.         Judgment: Judgment normal.            Medications       Current Outpatient Medications:     albuterol (PROVENTIL HFA,VENTOLIN HFA) 90 mcg/act inhaler, Inhale 1 puff every 6 (six) hours as needed for wheezing, Disp: 18 g, Rfl: 2    Cholecalciferol (VITAMIN D3) 2000 units capsule, Take 1 capsule by mouth daily, Disp: , Rfl:     diazepam (VALIUM) 5 mg tablet, Take 1 tablet (5 mg total) by mouth 1 (one) time for 1 dose, Disp: 1 tablet, Rfl: 0    Digestive Enzymes (DIGESTIVE ENZYME PO), Take by mouth, Disp: , Rfl:     metFORMIN (GLUCOPHAGE-XR) 500 mg 24 hr tablet, Start with 500 mg orally once daily with the evening meal increase in two weeks to 1000 mg (two tablets) if tolerated, Disp: 60 tablet, Rfl: 3    acyclovir (ZOVIRAX) 5 % ointment, Every 6 hours as needed (Patient not taking: Reported on 6/28/2024), Disp: 15 g, Rfl: 1    escitalopram (LEXAPRO) 5 mg tablet, TAKE 1 TABLET (5 MG TOTAL) BY MOUTH DAILY. (Patient not taking: Reported on 6/28/2024), Disp: 90 tablet, Rfl: 1    meloxicam (MOBIC) 15 mg tablet, PRN FOR BACK PAIN (Patient not taking: Reported on 6/28/2024), Disp: , Rfl:     sildenafil (VIAGRA) 100 mg tablet, Take 1 tablet (100 mg total) by mouth daily as needed for erectile dysfunction (Patient not taking: Reported on 6/28/2024), Disp: 10 tablet, Rfl: 11    tadalafil (CIALIS) 20 MG tablet, Take 1 tablet (20 mg total) by mouth daily as needed for erectile dysfunction PRN (Patient not taking: Reported on 6/28/2024), Disp: 10 tablet, Rfl: 0           Labs and imaging     Recent labs and imaging have been personally reviewed.  Lab Results   Component Value Date    WBC 5.74 06/14/2024    HGB 16.0 06/14/2024    HCT 47.0 06/14/2024    MCV 91 06/14/2024     06/14/2024     Lab Results   Component Value Date    SODIUM 139 06/14/2024    K 4.3 06/14/2024      06/14/2024    CO2 26 06/14/2024    AGAP 7 06/14/2024    BUN 15 06/14/2024    CREATININE 1.25 06/14/2024    GLUF 96 06/14/2024    CALCIUM 9.7 06/14/2024    AST 39 06/14/2024    ALT 65 (H) 06/14/2024    ALKPHOS 40 06/14/2024    TP 7.5 06/14/2024    TBILI 0.82 06/14/2024    EGFR 70 06/14/2024     Lab Results   Component Value Date    HGBA1C 5.2 06/14/2024     Lab Results   Component Value Date    XYL6OUTERQPM 1.708 06/14/2024     Lab Results   Component Value Date    CHOLESTEROL 165 06/14/2024     Lab Results   Component Value Date    HDL 31 (L) 06/14/2024     Lab Results   Component Value Date    TRIG 96 06/14/2024     Lab Results   Component Value Date    LDLCALC 115 (H) 06/14/2024

## 2024-07-02 ENCOUNTER — OFFICE VISIT (OUTPATIENT)
Dept: GASTROENTEROLOGY | Facility: CLINIC | Age: 44
End: 2024-07-02
Payer: COMMERCIAL

## 2024-07-02 VITALS
HEART RATE: 82 BPM | OXYGEN SATURATION: 97 % | RESPIRATION RATE: 18 BRPM | BODY MASS INDEX: 40.09 KG/M2 | HEIGHT: 70 IN | TEMPERATURE: 97.6 F | SYSTOLIC BLOOD PRESSURE: 122 MMHG | DIASTOLIC BLOOD PRESSURE: 82 MMHG | WEIGHT: 280 LBS

## 2024-07-02 DIAGNOSIS — R10.11 RUQ PAIN: ICD-10-CM

## 2024-07-02 DIAGNOSIS — K76.0 HEPATIC STEATOSIS: ICD-10-CM

## 2024-07-02 DIAGNOSIS — R79.89 ELEVATED LFTS: Primary | ICD-10-CM

## 2024-07-02 PROCEDURE — 99203 OFFICE O/P NEW LOW 30 MIN: CPT | Performed by: PHYSICIAN ASSISTANT

## 2024-07-02 NOTE — PROGRESS NOTES
[facility] - Outpatient Consultation  Balbina Regalado 43 y.o. male MRN: [id number]  Encounter: [id number]          ASSESSMENT AND PLAN:      1. Elevated LFTs  2. RUQ pain  3. Hepatic Steatosis  - He has known hepatic steatosis diagnosed in 2016 and over the past several months has been working on lifestyle changes and increasing exercise to address this, currently down about 20 lbs  - Seeing weight management and starting metformin  - Discussed important of weight loss and increasing muscle mass with resistance training  - High protein diet  - Vitamin E 400 IU daily can be helpful, no history of cardiac issues or DM  - Discussed elastography to determine extent/if any fibrosis is noted but he is not sure if he will be able to afford this cost wise as he has a high deductible plan so he will check with his insurance  - Suspect his R sided abdominal pain is related to his enlarged liver of 21 cm midclavicular as it is primarily positional and not associated with any other GI symptoms  - Follow up in 4-5 months        ______________________________________________________________________    HPI:  CRISTO is a pleasant 42 yo M with a PMH of hepatic steatosis, obesity, presenting for evaluation of elevated ALT, right upper quadrant pain, and a history of a fatty liver.  He was diagnosed with a fatty liver in 2016 and reports he did not really take it seriously and did not change his lifestyle but over the past couple months he generally concerned about improving his lifestyle and losing weight and becoming more active.  He reports that he is eating much better and going to the gym with exercising and has lost about 20 pounds so far.  He overall feels well.  He is was to start metformin which was prescribed by the weight management program.  He reports that he has been having right-sided pain that typically comes with certain movement and positions and is not associated with any other symptoms  such as nausea, vomiting, diarrhea, or reflux symptoms.  He does have an enlarged liver and had a recent ultrasound showing his liver is 21 cm without any biliary findings to explain pain.      REVIEW OF SYSTEMS:    CONSTITUTIONAL: Denies any fever, chills, rigors, and weight loss.  HEENT: No earache or tinnitus. Denies hearing loss or visual disturbances.  CARDIOVASCULAR: No chest pain or palpitations.   RESPIRATORY: Denies any cough, hemoptysis, shortness of breath or dyspnea on exertion.  GASTROINTESTINAL: As noted in the History of Present Illness.   GENITOURINARY: No problems with urination. Denies any hematuria or dysuria.  NEUROLOGIC: No dizziness or vertigo, denies headaches.   MUSCULOSKELETAL: Denies any muscle or joint pain.   SKIN: Denies skin rashes or itching.   ENDOCRINE: Denies excessive thirst. Denies intolerance to heat or cold.  PSYCHOSOCIAL: Denies depression or anxiety. Denies any recent memory loss.       Historical Information   Past Medical History:   Diagnosis Date    Anxiety 2021    Asthma     Depression 2021    Difficulty attaining erection     Erectile dysfunction     Fatty liver     Fear of needles     pt will pass out in the presents of needles    HTN (hypertension)     Hypertension 2011    Kidney disease     Liver disease 2004    Fatty non alcoholic    Low HDL (under 40)     Last assessed 02/08/17    Obesity     Sleep apnea, obstructive     Wheezing      Past Surgical History:   Procedure Laterality Date    ROOT CANAL       Social History   Social History     Substance and Sexual Activity   Alcohol Use No     Social History     Substance and Sexual Activity   Drug Use No     Social History     Tobacco Use   Smoking Status Never   Smokeless Tobacco Never     Family History   Problem Relation Age of Onset    Hypertension Mother     Multiple sclerosis Mother     Tremor Mother     Gout Father     Multiple sclerosis Brother     Tremor Brother     Multiple sclerosis Paternal Uncle     Tremor  "Paternal Uncle     Asthma Family     Other Family         CVA    Cancer Family     Asthma Maternal Aunt        Meds/Allergies       Current Outpatient Medications:     acyclovir (ZOVIRAX) 5 % ointment    albuterol (PROVENTIL HFA,VENTOLIN HFA) 90 mcg/act inhaler    Cholecalciferol (VITAMIN D3) 2000 units capsule    Digestive Enzymes (DIGESTIVE ENZYME PO)    meloxicam (MOBIC) 15 mg tablet    metFORMIN (GLUCOPHAGE-XR) 500 mg 24 hr tablet    diazepam (VALIUM) 5 mg tablet    escitalopram (LEXAPRO) 5 mg tablet    sildenafil (VIAGRA) 100 mg tablet    tadalafil (CIALIS) 20 MG tablet    Allergies   Allergen Reactions    Dust Mite Extract     Molds & Smuts     Penicillins Other (See Comments)     Unknown childhood            Objective     Blood pressure 122/82, pulse 82, temperature 97.6 °F (36.4 °C), temperature source Tympanic, resp. rate 18, height 5' 10\" (1.778 m), weight 127 kg (280 lb), SpO2 97%. Body mass index is 40.18 kg/m².        PHYSICAL EXAM:      General Appearance:   Alert, cooperative, no distress   HEENT:   Normocephalic, atraumatic, anicteric.     Neck:  Supple, symmetrical, trachea midline   Lungs:   Clear to auscultation bilaterally; no rales, rhonchi or wheezing; respirations unlabored    Heart::   Regular rate and rhythm; no murmur, rub, or gallop.   Abdomen:   Soft, non-tender, non-distended; normal bowel sounds; no masses, no organomegaly    Genitalia:   Deferred    Rectal:   Deferred    Extremities:  No cyanosis, clubbing or edema    Pulses:  2+ and symmetric    Skin:  No jaundice, rashes, or lesions    Lymph nodes:  No palpable cervical lymphadenopathy        Lab Results:   No visits with results within 1 Day(s) from this visit.   Latest known visit with results is:   Appointment on 06/14/2024   Component Date Value    WBC 06/14/2024 5.74     RBC 06/14/2024 5.15     Hemoglobin 06/14/2024 16.0     Hematocrit 06/14/2024 47.0     MCV 06/14/2024 91     MCH 06/14/2024 31.1     MCHC 06/14/2024 34.0     RDW " 06/14/2024 12.2     MPV 06/14/2024 9.5     Platelets 06/14/2024 194     nRBC 06/14/2024 0     Segmented % 06/14/2024 55     Immature Grans % 06/14/2024 0     Lymphocytes % 06/14/2024 33     Monocytes % 06/14/2024 9     Eosinophils Relative 06/14/2024 2     Basophils Relative 06/14/2024 1     Absolute Neutrophils 06/14/2024 3.16     Absolute Immature Grans 06/14/2024 0.02     Absolute Lymphocytes 06/14/2024 1.87     Absolute Monocytes 06/14/2024 0.49     Eosinophils Absolute 06/14/2024 0.13     Basophils Absolute 06/14/2024 0.07     TSH 3RD GENERATON 06/14/2024 1.708     Sodium 06/14/2024 139     Potassium 06/14/2024 4.3     Chloride 06/14/2024 106     CO2 06/14/2024 26     ANION GAP 06/14/2024 7     BUN 06/14/2024 15     Creatinine 06/14/2024 1.25     Glucose, Fasting 06/14/2024 96     Calcium 06/14/2024 9.7     AST 06/14/2024 39     ALT 06/14/2024 65 (H)     Alkaline Phosphatase 06/14/2024 40     Total Protein 06/14/2024 7.5     Albumin 06/14/2024 4.7     Total Bilirubin 06/14/2024 0.82     eGFR 06/14/2024 70     Cholesterol 06/14/2024 165     Triglycerides 06/14/2024 96     HDL, Direct 06/14/2024 31 (L)     LDL Calculated 06/14/2024 115 (H)     Hemoglobin A1C 06/14/2024 5.2     EAG 06/14/2024 103          Radiology Results:   US right upper quadrant    Result Date: 6/21/2024  Narrative: RIGHT UPPER QUADRANT ULTRASOUND INDICATION: R10.31: Right lower quadrant pain K76.0: Fatty (change of) liver, not elsewhere classified. COMPARISON: Abdominal ultrasound 12/5/2020 TECHNIQUE: Real-time ultrasound of the right upper quadrant was performed with a curvilinear transducer with both volumetric sweeps and still imaging techniques. FINDINGS: PANCREAS: Portions of the pancreas are obscured by bowel gas. Visualized portions of the pancreas are unremarkable. AORTA AND IVC: Visualized portions are normal for patient age. LIVER: Size: Enlarged. The liver measures 21.0 cm in the midclavicular line. Contour: Surface contour is  smooth. Parenchyma: There is marked diffuse increased echogenicity with smooth echotexture and significant beam attenuation with loss of periportal echogenicity. Most consistent with severe hepatic steatosis. No discrete liver masses are sonographically evident. Please note that diffusely increased parenchymal echogenicity and acoustic beam attenuation can limit sonographic evaluation for liver lesions. Limited imaging of the main portal vein shows it to be patent and hepatopetal. BILIARY: No gallbladder findings. No intrahepatic biliary dilatation. CBD measures 4.0 mm. No evidence of choledocholithiasis. KIDNEY: Right kidney measures 10.9 x 4.6 x 5.8 cm. Volume 152.3 mL Kidney within normal limits. ASCITES: None.     Impression: Hepatomegaly with diffusely hyperechoic appearance of the liver parenchyma which is most consistent with severe steatosis. Workstation performed: [id number]     XR spine lumbar minimum 4 views non injury    Result Date: 6/15/2024  Narrative: LUMBAR SPINE INDICATION:   Low back pain, unspecified. COMPARISON:  None. VIEWS:  XR SPINE LUMBAR MINIMUM 4 VIEWS NON INJURY Images: 5 FINDINGS: There are 5 non rib bearing lumbar vertebral bodies. There is no evidence of acute fracture or destructive osseous lesion. There is mild levocurvature. There is no spondylolisthesis.. There is moderate facet disease in the lower lumbar spine. There is mild multilevel spondylotic changes worse at L3-L4. The pedicles appear intact. Soft tissues are unremarkable.     Impression: Mild spondylosis at L3-L4. Moderate facet disease lower lumbar. Electronically signed: 06/15/2024 11:04 AM Aden Oliver MD

## 2024-07-22 ENCOUNTER — OFFICE VISIT (OUTPATIENT)
Dept: OBGYN CLINIC | Facility: CLINIC | Age: 44
End: 2024-07-22
Payer: COMMERCIAL

## 2024-07-22 VITALS
HEIGHT: 70 IN | DIASTOLIC BLOOD PRESSURE: 79 MMHG | BODY MASS INDEX: 39.43 KG/M2 | SYSTOLIC BLOOD PRESSURE: 116 MMHG | WEIGHT: 275.4 LBS | HEART RATE: 73 BPM

## 2024-07-22 DIAGNOSIS — M51.37 DISC DISEASE, DEGENERATIVE, LUMBAR OR LUMBOSACRAL: ICD-10-CM

## 2024-07-22 DIAGNOSIS — M54.50 LOW BACK PAIN WITHOUT SCIATICA, UNSPECIFIED BACK PAIN LATERALITY, UNSPECIFIED CHRONICITY: Primary | ICD-10-CM

## 2024-07-22 DIAGNOSIS — M41.50 DEGENERATIVE SCOLIOSIS: ICD-10-CM

## 2024-07-22 PROCEDURE — 99204 OFFICE O/P NEW MOD 45 MIN: CPT | Performed by: ORTHOPAEDIC SURGERY

## 2024-07-22 NOTE — PROGRESS NOTES
[facility] ORTHOPEDIC SPINE SURGERY  DR.AMIR JUDY MD  [address] [facility] [address] [phone]    HISTORY OF PRESENT ILLNESS:    Balbina Regalado is a 43 y.o. male who presents for initial evaluation of lumbar spine. The patient has occasional low back pain. He has difficulty shopping, and standing for long periods of time.  The patient notes he has arthritis at L3-4. He reports he has gained some weight and is working on weight loss which has improved his symptoms. The patient is using Meloxicam as needed or symptom management. The patient would like to know if there is another medication he should be taking. The patient has history of work-related injury back in 2018.  At that time he attended physical therapy.  He has not attended physical therapy recently.      ALLERGIES:   Allergies   Allergen Reactions    Dust Mite Extract     Molds & Smuts     Penicillins Other (See Comments)     Unknown childhood        MEDICATIONS:    Current Outpatient Medications:     acyclovir (ZOVIRAX) 5 % ointment, Every 6 hours as needed, Disp: 15 g, Rfl: 1    albuterol (PROVENTIL HFA,VENTOLIN HFA) 90 mcg/act inhaler, Inhale 1 puff every 6 (six) hours as needed for wheezing, Disp: 18 g, Rfl: 2    Cholecalciferol (VITAMIN D3) 2000 units capsule, Take 1 capsule by mouth daily, Disp: , Rfl:     diazepam (VALIUM) 5 mg tablet, Take 1 tablet (5 mg total) by mouth 1 (one) time for 1 dose, Disp: 1 tablet, Rfl: 0    Digestive Enzymes (DIGESTIVE ENZYME PO), Take by mouth, Disp: , Rfl:     meloxicam (MOBIC) 15 mg tablet, PRN FOR BACK PAIN, Disp: , Rfl:     metFORMIN (GLUCOPHAGE-XR) 500 mg 24 hr tablet, Start with 500 mg orally once daily with the evening meal increase in two weeks to 1000 mg (two tablets) if tolerated, Disp: 60 tablet, Rfl: 3     PAST MEDICAL HISTORY:   Past Medical History:   Diagnosis Date    Anxiety 2021    Asthma     Depression 2021    Difficulty attaining erection     Erectile dysfunction     Fatty liver      Fear of needles     pt will pass out in the presents of needles    HTN (hypertension)     Hypertension 2011    Kidney disease     Liver disease 2004    Fatty non alcoholic    Low HDL (under 40)     Last assessed 02/08/17    Obesity     Sleep apnea, obstructive     Wheezing        PAST SURGICAL HISTORY:  Past Surgical History:   Procedure Laterality Date    ROOT CANAL         SOCIAL HISTORY:  Social History     Tobacco Use   Smoking Status Never   Smokeless Tobacco Never          PHYSICAL EXAM:  43 y.o. male sitting comfortably on exam chair in no apparent distress.   Patient ambulates with normal gait.   Normal sagittal and coronal balance.    able to walk on heels/toes.  able to balance on one leg.     Sensation intact to light touch left L2 through S1 dermatomes.   Sensation intact to light touch right L2 through S1 dermatomes     ROM:  Full lumbar flexion  Lumbar extension to 30%  Symmetric pain free hip, knee and ankle ROM     Symmetric deep tendon reflexes bilateral lower extremities     2+  Straight leg raise test is negative Bilateral   The patient is well perfused distally.        RADIOGRAPHIC STUDIES:  MRI, lumbar spine, 8/23/2018: Moderate to severe degenerative changes at L3-4.  Mild degenerative changes at L4-5.  There is evidence of a right foraminal disc herniation at L3-4.  X-ray, lumbar spine, 6/14/2024: Mild idiopathic scoliosis with progression due to degenerative changes.  There is evidence of degenerative disc at L3-4 L4-5 and L5-S1.  No evidence of spondylolisthesis.      ASSESSMENT:  1. Low back pain without sciatica, unspecified back pain laterality, unspecified chronicity  2. Disc disease, degenerative, lumbar or lumbosacral  3. Degenerative scoliosis      PLAN:  43 y.o. male with low back pain, degenerative disc disease of the lumbar spine.  He had episode in 2018 where he suffered a disc herniation.  Since then he has had intermittent episodes of back pain.  He also is getting  significant mental weight since 2021 and is in the process of losing that weight.  He has felt better with the 25 pound weight loss.  He had very little symptoms today.  He has taken meloxicam which has been helpful but has not been prescribed meloxicam for this condition and was referred to orthopedics for evaluation of back pain and possible medical treatment of back pain.     The patient's x-rays and old MRI study were reviewed today.  He does have progression of degenerative disc disease in the lumbar spine with mild degenerative scoliosis.  Unfortunate there is no films to compare to 2018.  There was x-ray of the pelvis which I compared the x-rays from today with which shows mild progression of degenerative changes at least at L4-5 and L5-S1 level.  On physical examination there is no indication of radiculopathy or claudication.    The natural history of low back pain and degenerative disc disease was discussed with the patient today.  Role of physical therapy and exercise were discussed.  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  That does not help with back pain.  Better posture can be very effective when he comes to prevent intermittent low back pain.  He is also on the right track with weight loss.  Additional weight loss is going to be very beneficial in the long run.    At this time he is not a surgical candidate.  He is also not a candidate for pain management.  He is relatively asymptomatic.  If he does become symptomatic an updated MRI study is indicated.  He will need to go through physical therapy before repeat MRI.    I will see the patient back as needed.  Unfortunate I cannot assist him with medical treatment of his low back pain.  As long as the meloxicam is not contraindicated given his medical history specifically fatty liver, I see no reason why he cannot be prescribed meloxicam.  I will defer this to the PCP.             Scribe Attestation      I,:  Demetrice  Ibrahima am acting as a scribe while in the presence of the attending physician.:       I,:  Lb Renteria MD personally performed the services described in this documentation    as scribed in my presence.:

## 2024-07-22 NOTE — PATIENT INSTRUCTIONS
"Spine Basics  Understanding your spine and how it works can help you better understand some of the problems that occur from aging or injury.  Many demands are placed on your spine. It holds up your head, shoulders, and upper body. It gives you support to stand up straight, and gives you flexibility to bend and twist. It also protects your spinal cord.  Spinal Curves  Your spine is made up of three segments. When viewed from the side, these segments form three natural curves. The \"c-shaped\" curves of the neck (cervical spine) and lower back (lumbar spine) are called lordosis. The \"reverse c-shaped\" curve of the chest (thoracic spine) is called kyphosis.    Illustration shows the regions of the spine. When viewed from the side, the normal spine has three gentle curves.   These curves are important for balance and they help us to stand upright. If any one of the curves becomes too large or small, it becomes difficult to stand up straight and our posture appears abnormal.  Abnormal curvatures of the spine are also referred to as spinal deformity. These types of conditions include kyphosis of the thoracic spine (\"hunchback\"), lordosis of the lumbar spine (\"swayback\"), and \"flatback syndrome,\" a condition in which there is too little curvature of the spine.  Scoliosis is another type of spinal deformity. When viewing the spine from the front or back, scoliosis is a sideways curvature that makes the spine look more like an \"S\" or a \"C\" than a straight \"I.\"  Parts of the Spine  Vertebrae  Your spine is made up of small bones, called vertebrae, which are stacked on top of one another and create the natural curves of your back.  These bones connect to create a canal that protects the spinal cord and nerve roots.    The spine from the front.     The spine from the back.   The cervical spine is made up of seven small vertebrae that begin at the base of the skull and end at the upper chest. The thoracic spine is made up of 12 " vertebrae that start from the upper chest to the middle back and connect to the rib cage. The lumbar vertebra consists of five larger vertebrae. These vertebrae are larger because they carry more of your body's weight.    Parts of the lumbar spine.   Spinal Cord and Nerves  The spinal cord extends from the skull to your lower back and travels through the middle part of each stacked vertebra, called the central canal. Nerves branch out from the spinal cord through openings in the vertebrae and carry messages between the brain and muscles.  The spinal cord ends around the first and second lumbar vertebrae in the lower back and continues as nerve roots. This bundle of nerve roots is called the cauda equina. They exit the spinal canal through openings in the vertebrae (foramen), just like other nerve roots. In the pelvis, some of the nerves group into the sciatic nerve, which extends down the leg.    The cauda equina.   Muscles and Ligaments  These provide support and stability for your spine and upper body. Strong ligaments connect your vertebrae and help keep the spinal column in position.  Intervertebral Disks  Intervertebral disks sit in between the vertebrae. They are flat and round, and about a half inch thick.  Intervertebral disks are made up of two components:  Nucleus pulposus. The nucleus pulposus is jelly-like and makes up the center of the disk. The jelly is partly made of water and gives the disk flexibility and strength.  Annulus fibrosus. This is the flexible outer ring of the disk. It is made up of several layers, similar to elastic bands.  When you are standing or moving, weight is put onto the nucleus. In response, the nucleus expands. The annulus holds the nucleus in place. This allows movement to take place, yet maintains the strength of the spine. In effect, disks act as shock absorbers for the spine.  The intervertebral disk is a very important structure. Many nerve endings supply the annulus and,  as a result, an injured annulus can cause pain.    Healthy intervertebral disk (cross-section view).   Facet Joints  Between the back of the vertebrae are small joints that also help your spine move. These facet joints have a cartilage surface, very much like a hip or a knee joint does. The facet joints are important for allowing rotation of the spine but may develop arthritis and become a source for low back or neck pain

## 2024-07-24 ENCOUNTER — TELEPHONE (OUTPATIENT)
Dept: INTERNAL MEDICINE CLINIC | Facility: CLINIC | Age: 44
End: 2024-07-24

## 2024-07-24 ENCOUNTER — TELEPHONE (OUTPATIENT)
Dept: BARIATRICS | Facility: CLINIC | Age: 44
End: 2024-07-24

## 2024-07-24 NOTE — TELEPHONE ENCOUNTER
Patient went to see an orthopedic surgeon and he recommended seeing sports medicine that he didn't need to see a surgeon. He would like to know if he should see sports medicine or pain management? He would like to know specifically of whom he should see?     Please advise...    Call back #[phone]

## 2024-07-24 NOTE — TELEPHONE ENCOUNTER
Fax received from [facility] for 90 Day Prescription for:    Metformin HCL  MG Tablet  Quantity: 180.0    Thank you

## 2024-07-25 DIAGNOSIS — E66.9 OBESITY, CLASS II, BMI 35-39.9: ICD-10-CM

## 2024-07-25 RX ORDER — METFORMIN HYDROCHLORIDE 500 MG/1
TABLET, EXTENDED RELEASE ORAL
Qty: 180 TABLET | Refills: 3 | Status: SHIPPED | OUTPATIENT
Start: 2024-07-25

## 2024-09-19 ENCOUNTER — OFFICE VISIT (OUTPATIENT)
Dept: INTERNAL MEDICINE CLINIC | Facility: CLINIC | Age: 44
End: 2024-09-19
Payer: COMMERCIAL

## 2024-09-19 VITALS
HEART RATE: 89 BPM | WEIGHT: 271.4 LBS | SYSTOLIC BLOOD PRESSURE: 116 MMHG | DIASTOLIC BLOOD PRESSURE: 84 MMHG | HEIGHT: 70 IN | RESPIRATION RATE: 18 BRPM | BODY MASS INDEX: 38.85 KG/M2 | OXYGEN SATURATION: 96 %

## 2024-09-19 DIAGNOSIS — Z00.00 ANNUAL PHYSICAL EXAM: Primary | ICD-10-CM

## 2024-09-19 PROBLEM — R74.8 LOW SERUM HDL: Status: RESOLVED | Noted: 2018-02-20 | Resolved: 2024-09-19

## 2024-09-19 PROCEDURE — 99396 PREV VISIT EST AGE 40-64: CPT | Performed by: INTERNAL MEDICINE

## 2024-09-19 NOTE — TELEPHONE ENCOUNTER
Patient called and expressed the last time he called in he requested a virtual visit with provider Samson, but was told he needed to be seen in person because he needs his vital/weight check-in completed. Patient stated that he as an appointment today at 3pm with his PCP who will do his physical/vitals. Patient is asking if provider Samson can accept the report with his vitals from his PCP visit today and reconsider doing a virtual visit for his appointment on 09/23. Please contact patient with provider response.

## 2024-09-19 NOTE — PROGRESS NOTES
Adult Annual Physical  Name: Balbina Regalado      : 1980      MRN: [id number]  Encounter Provider: Ayad Lam MD  Encounter Date: 2024   Encounter department: [facility] INTERNAL MEDICINE [place] [facility]    Assessment & Plan  Annual physical exam         Immunizations and preventive care screenings were discussed with patient today. Appropriate education was printed on patient's after visit summary.    Discussed risks and benefits of prostate cancer screening. We discussed the controversial history of PSA screening for prostate cancer in the [place] as well as the risk of over detection and over treatment of prostate cancer by way of PSA screening.  The patient understands that PSA blood testing is an imperfect way to screen for prostate cancer and that elevated PSA levels in the blood may also be caused by infection, inflammation, prostatic trauma or manipulation, urological procedures, or by benign prostatic enlargement.    The role of the digital rectal examination in prostate cancer screening was also discussed and I discussed with him that there is large interobserver variability in the findings of digital rectal examination.    Counseling:  Alcohol/drug use: discussed moderation in alcohol intake, the recommendations for healthy alcohol use, and avoidance of illicit drug use.  Dental Health: discussed importance of regular tooth brushing, flossing, and dental visits.  Exercise: the importance of regular exercise/physical activity was discussed. Recommend exercise 3-5 times per week for at least 30 minutes.       Depression Screening and Follow-up Plan: Patient was screened for depression during today's encounter. They screened negative with a PHQ-9 score of 3.      History of Present Illness   Adult Annual Physical:  Patient presents for annual physical.     Diet and Physical Activity:  - Diet/Nutrition: well balanced diet.  - Exercise: no formal exercise.    Depression  Screening:    - PHQ-9 Score: 3    General Health:  - Sleep: sleeps well. uses CPAP  - Hearing: normal hearing bilateral ears.  - Vision: no vision problems.  - Dental: regular dental visits.     Health:  - History of STDs: no.   - Urinary symptoms: none.     Advanced Care Planning:  - Has an advanced directive?: no    - Has a durable medical POA?: no    - ACP document given to patient?: no      Review of Systems   Constitutional:  Negative for chills and fever.   HENT:  Negative for ear pain and sore throat.    Eyes:  Negative for pain and visual disturbance.   Respiratory:  Negative for cough and shortness of breath.    Cardiovascular:  Negative for chest pain and palpitations.   Gastrointestinal:  Negative for abdominal pain and vomiting.   Genitourinary:  Negative for dysuria and hematuria.   Musculoskeletal:  Negative for arthralgias and back pain.   Skin:  Negative for color change and rash.   Neurological:  Negative for seizures and syncope.   All other systems reviewed and are negative.    Past Medical History   Past Medical History:   Diagnosis Date    Anxiety 2021    Asthma     Depression 2021    Difficulty attaining erection     Erectile dysfunction     Fatty liver     Fear of needles     pt will pass out in the presents of needles    HTN (hypertension)     Hypertension 2011    Kidney disease     Liver disease 2004    Fatty non alcoholic    Low HDL (under 40)     Last assessed 02/08/17    Obesity     Sleep apnea, obstructive     Wheezing      Past Surgical History:   Procedure Laterality Date    ROOT CANAL       Family History   Problem Relation Age of Onset    Hypertension Mother     Multiple sclerosis Mother     Tremor Mother     Gout Father     Multiple sclerosis Brother     Tremor Brother     Multiple sclerosis Paternal Uncle     Tremor Paternal Uncle     Asthma Family     Other Family         CVA    Cancer Family     Asthma Maternal Aunt      Current Outpatient Medications on File Prior to Visit  "  Medication Sig Dispense Refill    acyclovir (ZOVIRAX) 5 % ointment Every 6 hours as needed 15 g 1    albuterol (PROVENTIL HFA,VENTOLIN HFA) 90 mcg/act inhaler Inhale 1 puff every 6 (six) hours as needed for wheezing 18 g 2    Cholecalciferol (VITAMIN D3) 2000 units capsule Take 1 capsule by mouth daily      diazepam (VALIUM) 5 mg tablet Take 1 tablet (5 mg total) by mouth 1 (one) time for 1 dose 1 tablet 0    Digestive Enzymes (DIGESTIVE ENZYME PO) Take by mouth      meloxicam (MOBIC) 15 mg tablet PRN FOR BACK PAIN      metFORMIN (GLUCOPHAGE-XR) 500 mg 24 hr tablet Start with 500 mg orally once daily with the evening meal increase in two weeks to 1000 mg (two tablets) if tolerated 180 tablet 3     No current facility-administered medications on file prior to visit.     Allergies   Allergen Reactions    Dust Mite Extract     Molds & Smuts     Penicillins Other (See Comments)     Unknown childhood         Objective   /84 (BP Location: Left arm, Patient Position: Sitting, Cuff Size: Adult)   Pulse 89   Resp 18   Ht 5' 10\" (1.778 m)   Wt 123 kg (271 lb 6.4 oz)   SpO2 96%   BMI 38.94 kg/m²     Physical Exam  Vitals and nursing note reviewed.   Constitutional:       General: He is not in acute distress.     Appearance: He is well-developed.   HENT:      Head: Normocephalic and atraumatic.   Eyes:      Conjunctiva/sclera: Conjunctivae normal.   Cardiovascular:      Rate and Rhythm: Normal rate and regular rhythm.      Heart sounds: No murmur heard.  Pulmonary:      Effort: Pulmonary effort is normal. No respiratory distress.      Breath sounds: Normal breath sounds.   Abdominal:      Palpations: Abdomen is soft.      Tenderness: There is no abdominal tenderness.   Musculoskeletal:         General: No swelling.      Cervical back: Neck supple.   Skin:     General: Skin is warm and dry.      Capillary Refill: Capillary refill takes less than 2 seconds.   Neurological:      Mental Status: He is alert. "   Psychiatric:         Mood and Affect: Mood normal.     I have spent a total time of 25 minutes in caring for this patient on the day of the visit/encounter including Instructions for management, Importance of tx compliance, Impressions, and Reviewing / ordering tests, medicine, procedures  .

## 2024-09-23 PROBLEM — E66.01 CLASS 2 SEVERE OBESITY DUE TO EXCESS CALORIES WITH SERIOUS COMORBIDITY AND BODY MASS INDEX (BMI) OF 38.0 TO 38.9 IN ADULT (HCC): Status: ACTIVE | Noted: 2024-09-23

## 2024-09-23 PROBLEM — E66.812 CLASS 2 SEVERE OBESITY DUE TO EXCESS CALORIES WITH SERIOUS COMORBIDITY AND BODY MASS INDEX (BMI) OF 38.0 TO 38.9 IN ADULT (HCC): Status: ACTIVE | Noted: 2024-09-23

## 2024-09-26 DIAGNOSIS — E66.01 CLASS 2 SEVERE OBESITY DUE TO EXCESS CALORIES WITH SERIOUS COMORBIDITY AND BODY MASS INDEX (BMI) OF 38.0 TO 38.9 IN ADULT (HCC): ICD-10-CM

## 2024-09-26 DIAGNOSIS — E66.812 CLASS 2 SEVERE OBESITY DUE TO EXCESS CALORIES WITH SERIOUS COMORBIDITY AND BODY MASS INDEX (BMI) OF 38.0 TO 38.9 IN ADULT (HCC): ICD-10-CM

## 2024-09-26 RX ORDER — METFORMIN HYDROCHLORIDE 750 MG/1
TABLET, EXTENDED RELEASE ORAL
Qty: 180 TABLET | Refills: 3 | Status: SHIPPED | OUTPATIENT
Start: 2024-09-26

## 2024-11-19 ENCOUNTER — OFFICE VISIT (OUTPATIENT)
Dept: GASTROENTEROLOGY | Facility: CLINIC | Age: 44
End: 2024-11-19
Payer: COMMERCIAL

## 2024-11-19 VITALS
DIASTOLIC BLOOD PRESSURE: 84 MMHG | TEMPERATURE: 98.4 F | RESPIRATION RATE: 18 BRPM | WEIGHT: 260 LBS | HEART RATE: 80 BPM | BODY MASS INDEX: 36.4 KG/M2 | HEIGHT: 71 IN | SYSTOLIC BLOOD PRESSURE: 126 MMHG | OXYGEN SATURATION: 98 %

## 2024-11-19 DIAGNOSIS — R74.01 ELEVATED ALT MEASUREMENT: ICD-10-CM

## 2024-11-19 DIAGNOSIS — K76.0 HEPATIC STEATOSIS: Primary | ICD-10-CM

## 2024-11-19 DIAGNOSIS — K62.5 BRBPR (BRIGHT RED BLOOD PER RECTUM): ICD-10-CM

## 2024-11-19 PROCEDURE — 99213 OFFICE O/P EST LOW 20 MIN: CPT | Performed by: PHYSICIAN ASSISTANT

## 2024-11-19 NOTE — PROGRESS NOTES
[facility] - Outpatient Follow-up Note  Balbina Regalado 44 y.o. male MRN: [id number]  Encounter: [id number]          ASSESSMENT AND PLAN:      1. BRBPR (bright red blood per rectum) (Primary)  - He has seen this occasionally, maybe every few months and associated with constipation so likely hemorrhoidal  - trial fiber supplementation over the next 2-3 months, if still seeing bleeding despite regulating BMs, would plan for colonoscopy; he will message me how he is doing    2. Elevated ALT measurement  3. Hepatic steatosis  - He has known hepatic steatosis diagnosed in 2016 and over the past 6 months has worked on diet and exercise with losing about 40 lbs so far  - On metformin and topamax through weight management  - High protein diet  - Will repeat LFTs in the next several months to ensure normalization  - Elastography discussed last visit to measure if there is any fibrosis but likely will not be able to afford this so we will hold off on this unless LFTs are persistently elevated or worsen      ______________________________________________________________________    SUBJECTIVE:  CRISTO is a pleasant 43 yo M presenting for routine follow up of RUQ pain and elevated ALT/fatty liver disease.  He has overall lost about 40 lbs since May with diet and exercise. 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 His BMs are alternating between constipation and loose stool. He never sees blood mixed in with stool  but will see if with wiping at times, every few months maybe at most. He thinks his RUQ pain is better with his continued weight loss. He is on metformin and topamax, has seen bariatrics.       REVIEW OF SYSTEMS IS OTHERWISE NEGATIVE.      Historical Information   Past Medical History:   Diagnosis Date    Anxiety 2021    Asthma     Depression 2021  "   Difficulty attaining erection     Erectile dysfunction     Fatty liver     Fear of needles     pt will pass out in the presents of needles    HTN (hypertension)     Hypertension 2011    Kidney disease     Liver disease 2004    Fatty non alcoholic    Low HDL (under 40)     Last assessed 02/08/17    Obesity     Sleep apnea, obstructive     Wheezing      Past Surgical History:   Procedure Laterality Date    ROOT CANAL       Social History   Social History     Substance and Sexual Activity   Alcohol Use No     Social History     Substance and Sexual Activity   Drug Use No     Social History     Tobacco Use   Smoking Status Never   Smokeless Tobacco Never     Family History   Problem Relation Age of Onset    Hypertension Mother     Multiple sclerosis Mother     Tremor Mother     Gout Father     Multiple sclerosis Brother     Tremor Brother     Multiple sclerosis Paternal Uncle     Tremor Paternal Uncle     Asthma Family     Other Family         CVA    Cancer Family     Asthma Maternal Aunt        Meds/Allergies       Current Outpatient Medications:     acyclovir (ZOVIRAX) 5 % ointment    albuterol (PROVENTIL HFA,VENTOLIN HFA) 90 mcg/act inhaler    Digestive Enzymes (DIGESTIVE ENZYME PO)    meloxicam (MOBIC) 15 mg tablet    metFORMIN (GLUCOPHAGE-XR) 750 mg 24 hr tablet    topiramate (Topamax) 25 mg tablet    Cholecalciferol (VITAMIN D3) 2000 units capsule    diazepam (VALIUM) 5 mg tablet    Allergies   Allergen Reactions    Dust Mite Extract     Molds & Smuts     Penicillins Other (See Comments)     Unknown childhood            Objective     Blood pressure 126/84, pulse 80, temperature 98.4 °F (36.9 °C), temperature source Tympanic, resp. rate 18, height 5' 11\" (1.803 m), weight 118 kg (260 lb), SpO2 98%. Body mass index is 36.26 kg/m².      PHYSICAL EXAM:      General Appearance:   Alert, cooperative, no distress   HEENT:   Normocephalic, atraumatic, anicteric.     Neck:  Supple, symmetrical, trachea midline   Lungs:  "  Clear to auscultation bilaterally; no rales, rhonchi or wheezing; respirations unlabored    Heart::   Regular rate and rhythm; no murmur, rub, or gallop.   Abdomen:   Soft, non-tender, non-distended; normal bowel sounds; no masses, no organomegaly    Genitalia:   Deferred    Rectal:   Deferred    Extremities:  No cyanosis, clubbing or edema    Pulses:  2+ and symmetric    Skin:  No jaundice, rashes, or lesions    Lymph nodes:  No palpable cervical lymphadenopathy        Lab Results:   No visits with results within 1 Day(s) from this visit.   Latest known visit with results is:   Appointment on 06/14/2024   Component Date Value    WBC 06/14/2024 5.74     RBC 06/14/2024 5.15     Hemoglobin 06/14/2024 16.0     Hematocrit 06/14/2024 47.0     MCV 06/14/2024 91     MCH 06/14/2024 31.1     MCHC 06/14/2024 34.0     RDW 06/14/2024 12.2     MPV 06/14/2024 9.5     Platelets 06/14/2024 194     nRBC 06/14/2024 0     Segmented % 06/14/2024 55     Immature Grans % 06/14/2024 0     Lymphocytes % 06/14/2024 33     Monocytes % 06/14/2024 9     Eosinophils Relative 06/14/2024 2     Basophils Relative 06/14/2024 1     Absolute Neutrophils 06/14/2024 3.16     Absolute Immature Grans 06/14/2024 0.02     Absolute Lymphocytes 06/14/2024 1.87     Absolute Monocytes 06/14/2024 0.49     Eosinophils Absolute 06/14/2024 0.13     Basophils Absolute 06/14/2024 0.07     TSH 3RD GENERATON 06/14/2024 1.708     Sodium 06/14/2024 139     Potassium 06/14/2024 4.3     Chloride 06/14/2024 106     CO2 06/14/2024 26     ANION GAP 06/14/2024 7     BUN 06/14/2024 15     Creatinine 06/14/2024 1.25     Glucose, Fasting 06/14/2024 96     Calcium 06/14/2024 9.7     AST 06/14/2024 39     ALT 06/14/2024 65 (H)     Alkaline Phosphatase 06/14/2024 40     Total Protein 06/14/2024 7.5     Albumin 06/14/2024 4.7     Total Bilirubin 06/14/2024 0.82     eGFR 06/14/2024 70     Cholesterol 06/14/2024 165     Triglycerides 06/14/2024 96     HDL, Direct 06/14/2024 31 (L)      LDL Calculated 06/14/2024 115 (H)     Hemoglobin A1C 06/14/2024 5.2     EAG 06/14/2024 103          Radiology Results:   No results found.

## 2024-11-29 DIAGNOSIS — E66.01 CLASS 2 SEVERE OBESITY DUE TO EXCESS CALORIES WITH SERIOUS COMORBIDITY AND BODY MASS INDEX (BMI) OF 38.0 TO 38.9 IN ADULT (HCC): ICD-10-CM

## 2024-11-29 DIAGNOSIS — E66.812 CLASS 2 SEVERE OBESITY DUE TO EXCESS CALORIES WITH SERIOUS COMORBIDITY AND BODY MASS INDEX (BMI) OF 38.0 TO 38.9 IN ADULT (HCC): ICD-10-CM

## 2024-11-29 RX ORDER — TOPIRAMATE 25 MG/1
TABLET, FILM COATED ORAL
Qty: 60 TABLET | Refills: 3 | Status: SHIPPED | OUTPATIENT
Start: 2024-11-29

## 2024-11-29 RX ORDER — TOPIRAMATE 25 MG/1
TABLET, FILM COATED ORAL
Qty: 60 TABLET | Refills: 3 | Status: CANCELLED | OUTPATIENT
Start: 2024-11-29

## 2025-01-24 DIAGNOSIS — E66.01 CLASS 2 SEVERE OBESITY DUE TO EXCESS CALORIES WITH SERIOUS COMORBIDITY AND BODY MASS INDEX (BMI) OF 38.0 TO 38.9 IN ADULT (HCC): ICD-10-CM

## 2025-01-24 DIAGNOSIS — E66.812 CLASS 2 SEVERE OBESITY DUE TO EXCESS CALORIES WITH SERIOUS COMORBIDITY AND BODY MASS INDEX (BMI) OF 38.0 TO 38.9 IN ADULT (HCC): ICD-10-CM

## 2025-01-24 RX ORDER — TOPIRAMATE 25 MG/1
TABLET, FILM COATED ORAL
Qty: 60 TABLET | Refills: 5 | Status: SHIPPED | OUTPATIENT
Start: 2025-01-24

## 2025-01-24 NOTE — TELEPHONE ENCOUNTER
Reason for call:   [x] Refill   [] Prior Auth  [] Other:     Office:   [] PCP/Provider -   [x] Specialty/Provider - Sangita Davies MD     Medication: (Topamax) 25 mg     Dose/Frequency: 1 tab bid    Quantity: 60 tabs    Pharmacy: [facility]/pharmacy #[id number] - [place] - [address]      Does the patient have enough for 3 days?   [] Yes   [x] No - Send as HP to POD

## 2025-03-04 ENCOUNTER — TELEPHONE (OUTPATIENT)
Dept: ENDOCRINOLOGY | Facility: CLINIC | Age: 45
End: 2025-03-04

## 2025-03-04 NOTE — TELEPHONE ENCOUNTER
Pt would like a script sent over to pharmacy for 90 day supply.    Reason for call:   [x] Refill   [] Prior Auth  [] Other:     Office:   [] PCP/Provider -   [x] Specialty/Provider -     Medication: Topiramate    Dose/Frequency: 25 mg    Quantity:     Pharmacy: [facility]/pharmacy #[id number] - [place] - [address]      Does the patient have enough for 3 days?   [] Yes   [x] No - Send as HP to POD

## 2025-03-05 DIAGNOSIS — E66.01 CLASS 2 SEVERE OBESITY DUE TO EXCESS CALORIES WITH SERIOUS COMORBIDITY AND BODY MASS INDEX (BMI) OF 38.0 TO 38.9 IN ADULT (HCC): ICD-10-CM

## 2025-03-05 DIAGNOSIS — E66.812 CLASS 2 SEVERE OBESITY DUE TO EXCESS CALORIES WITH SERIOUS COMORBIDITY AND BODY MASS INDEX (BMI) OF 38.0 TO 38.9 IN ADULT (HCC): ICD-10-CM

## 2025-03-05 RX ORDER — TOPIRAMATE 25 MG/1
TABLET, FILM COATED ORAL
Qty: 60 TABLET | Refills: 0 | Status: SHIPPED | OUTPATIENT
Start: 2025-03-05

## 2025-03-05 NOTE — TELEPHONE ENCOUNTER
Please schedule a follow-up appointment for patient. One month supply of medication will be provided

## 2025-03-06 NOTE — TELEPHONE ENCOUNTER
Spoke to patient he ask to be scheduled at the [place] office for his f/up, and I also mentioned to  that the patient requested the meds she prescribed to be sent as a 3 month supply to his pharmacy.

## 2025-03-19 ENCOUNTER — OFFICE VISIT (OUTPATIENT)
Dept: BARIATRICS | Facility: CLINIC | Age: 45
End: 2025-03-19

## 2025-03-19 VITALS
RESPIRATION RATE: 12 BRPM | BODY MASS INDEX: 36.12 KG/M2 | HEART RATE: 76 BPM | DIASTOLIC BLOOD PRESSURE: 80 MMHG | HEIGHT: 71 IN | WEIGHT: 258 LBS | OXYGEN SATURATION: 98 % | SYSTOLIC BLOOD PRESSURE: 140 MMHG

## 2025-03-19 DIAGNOSIS — E66.812 OBESITY, CLASS II, BMI 35-39.9: ICD-10-CM

## 2025-03-19 DIAGNOSIS — G47.33 OSA (OBSTRUCTIVE SLEEP APNEA): ICD-10-CM

## 2025-03-19 DIAGNOSIS — E66.01 CLASS 2 SEVERE OBESITY DUE TO EXCESS CALORIES WITH SERIOUS COMORBIDITY AND BODY MASS INDEX (BMI) OF 38.0 TO 38.9 IN ADULT (HCC): Primary | ICD-10-CM

## 2025-03-19 DIAGNOSIS — I10 BENIGN ESSENTIAL HYPERTENSION: ICD-10-CM

## 2025-03-19 DIAGNOSIS — E66.812 CLASS 2 SEVERE OBESITY DUE TO EXCESS CALORIES WITH SERIOUS COMORBIDITY AND BODY MASS INDEX (BMI) OF 38.0 TO 38.9 IN ADULT (HCC): Primary | ICD-10-CM

## 2025-03-19 RX ORDER — TOPIRAMATE 25 MG/1
TABLET, FILM COATED ORAL
Qty: 60 TABLET | Refills: 3 | Status: SHIPPED | OUTPATIENT
Start: 2025-03-19

## 2025-03-19 RX ORDER — METFORMIN HYDROCHLORIDE 750 MG/1
TABLET, EXTENDED RELEASE ORAL
Qty: 180 TABLET | Refills: 3 | Status: SHIPPED | OUTPATIENT
Start: 2025-03-19

## 2025-03-19 NOTE — ASSESSMENT & PLAN NOTE
- Continue with metformin and Topamax as prescribed.   - Follow-up with PCP regarding concern for worsening ED.   - Consider discontinuing Topamax if Ed symptoms persist despite work-up.  - Patient is pursuing Conservative Program and follow up visits with medical weight management provider  - Initial weight loss goal of 5-10% weight loss for improved health. Weight loss can improve patient's co-morbid conditions and/or prevent weight-related complications.  - Explained the importance of continuing lifestyle changes in addition to any anti-obesity medications.   - Labs reviewed.     General Recommendations:  Nutrition:  Eat breakfast daily.  Do not skip meals.      Food log (ie.) www.[employer].com, sparkpeople.com, loseit.com, calorieking.com, etc.     Practice mindful eating.  Be sure to set aside time to eat, eat slowly, and savor your food.     Hydration:    At least 64oz of water daily.  No sugar sweetened beverages.  No juice (eat the fruit instead).     Exercise:  Studies have shown that the ideal exercise goal is somewhere between 150 to 300 minutes of moderate intensity exercise a week.  Start with exercising 10 minutes every other day and gradually increase physical activity with a goal of at least 150 minutes of moderate intensity exercise a week, divided over at least 3 days a week.  An example of this would be exercising 30 minutes a day, 5 days a week.  Resistance training can increase muscle mass and increase our resting metabolic rate.   FULL BODY resistance training is recommended 2-3 times a week.  Do not do this on consecutive days to allow for muscle recovery.     Aim for a bare minimum 5000 steps, even on days you do not exercise.     Monitoring:   Weigh yourself daily.  If this causes undue stress, then just weigh yourself once a week.  Weigh yourself the same time of the day with the same amount of clothing on.  Preferably this should be done after waking up, before you eat, and with no  clothing or minimal clothing on.

## 2025-03-19 NOTE — PATIENT INSTRUCTIONS
- Continue with metformin and Topamax as prescribed.   - Follow-up with PCP regarding concern for worsening ED.   - Consider discontinuing Topamax if Ed symptoms persist despite work-up.  - Patient is pursuing Conservative Program and follow up visits with medical weight management provider  - Initial weight loss goal of 5-10% weight loss for improved health. Weight loss can improve patient's co-morbid conditions and/or prevent weight-related complications.  - Explained the importance of continuing lifestyle changes in addition to any anti-obesity medications.   - Labs reviewed.     General Recommendations:  Nutrition:  Eat breakfast daily.  Do not skip meals.      Food log (ie.) www.[employer].com, sparkpeople.com, loseit.com, calorieking.com, etc.     Practice mindful eating.  Be sure to set aside time to eat, eat slowly, and savor your food.     Hydration:    At least 64oz of water daily.  No sugar sweetened beverages.  No juice (eat the fruit instead).     Exercise:  Studies have shown that the ideal exercise goal is somewhere between 150 to 300 minutes of moderate intensity exercise a week.  Start with exercising 10 minutes every other day and gradually increase physical activity with a goal of at least 150 minutes of moderate intensity exercise a week, divided over at least 3 days a week.  An example of this would be exercising 30 minutes a day, 5 days a week.  Resistance training can increase muscle mass and increase our resting metabolic rate.   FULL BODY resistance training is recommended 2-3 times a week.  Do not do this on consecutive days to allow for muscle recovery.     Aim for a bare minimum 5000 steps, even on days you do not exercise.     Monitoring:   Weigh yourself daily.  If this causes undue stress, then just weigh yourself once a week.  Weigh yourself the same time of the day with the same amount of clothing on.  Preferably this should be done after waking up, before you eat, and with no  clothing or minimal clothing on.

## 2025-03-20 DIAGNOSIS — G47.33 OSA (OBSTRUCTIVE SLEEP APNEA): ICD-10-CM

## 2025-03-20 DIAGNOSIS — F52.21 ERECTILE DYSFUNCTION OF NON-ORGANIC ORIGIN: ICD-10-CM

## 2025-03-20 DIAGNOSIS — R74.8 LOW SERUM HDL: Primary | ICD-10-CM

## 2025-03-20 DIAGNOSIS — R73.01 IMPAIRED FASTING BLOOD SUGAR: ICD-10-CM

## 2025-03-20 DIAGNOSIS — R03.0 ELEVATED BLOOD PRESSURE READING WITHOUT DIAGNOSIS OF HYPERTENSION: ICD-10-CM

## 2025-03-31 ENCOUNTER — OFFICE VISIT (OUTPATIENT)
Dept: INTERNAL MEDICINE CLINIC | Facility: CLINIC | Age: 45
End: 2025-03-31
Payer: COMMERCIAL

## 2025-03-31 VITALS
OXYGEN SATURATION: 97 % | DIASTOLIC BLOOD PRESSURE: 88 MMHG | BODY MASS INDEX: 36.68 KG/M2 | RESPIRATION RATE: 16 BRPM | HEIGHT: 71 IN | WEIGHT: 262 LBS | HEART RATE: 81 BPM | SYSTOLIC BLOOD PRESSURE: 136 MMHG

## 2025-03-31 DIAGNOSIS — E66.812 OBESITY, CLASS II, BMI 35-39.9: ICD-10-CM

## 2025-03-31 DIAGNOSIS — J45.909 MODERATE ASTHMA WITHOUT COMPLICATION, UNSPECIFIED WHETHER PERSISTENT: Primary | ICD-10-CM

## 2025-03-31 DIAGNOSIS — F52.21 ERECTILE DYSFUNCTION OF NON-ORGANIC ORIGIN: ICD-10-CM

## 2025-03-31 DIAGNOSIS — I10 BENIGN ESSENTIAL HYPERTENSION: ICD-10-CM

## 2025-03-31 DIAGNOSIS — G47.33 OSA (OBSTRUCTIVE SLEEP APNEA): ICD-10-CM

## 2025-03-31 DIAGNOSIS — F40.298 NEEDLE PHOBIA: ICD-10-CM

## 2025-03-31 PROCEDURE — 99214 OFFICE O/P EST MOD 30 MIN: CPT | Performed by: INTERNAL MEDICINE

## 2025-03-31 RX ORDER — TADALAFIL 20 MG/1
20 TABLET ORAL DAILY PRN
Qty: 30 TABLET | Refills: 3 | Status: SHIPPED | OUTPATIENT
Start: 2025-03-31

## 2025-03-31 RX ORDER — DIAZEPAM 5 MG/1
5 TABLET ORAL ONCE
Qty: 1 TABLET | Refills: 0 | Status: SHIPPED | OUTPATIENT
Start: 2025-03-31 | End: 2025-03-31

## 2025-03-31 RX ORDER — ALBUTEROL SULFATE 90 UG/1
1 INHALANT RESPIRATORY (INHALATION) EVERY 6 HOURS PRN
Qty: 18 G | Refills: 2 | Status: SHIPPED | OUTPATIENT
Start: 2025-03-31

## 2025-03-31 NOTE — ASSESSMENT & PLAN NOTE
Controlled. Has not had to use albuterol recently.   Orders:    albuterol (PROVENTIL HFA,VENTOLIN HFA) 90 mcg/act inhaler; Inhale 1 puff every 6 (six) hours as needed for wheezing

## 2025-03-31 NOTE — PROGRESS NOTES
Name: Balbina Regalado      : 1980      MRN: [id number]  Encounter Provider: Ayad Lam MD  Encounter Date: 3/31/2025   Encounter department: [facility] INTERNAL MEDICINE [place]  :  Assessment & Plan  Moderate asthma without complication, unspecified whether persistent  Controlled. Has not had to use albuterol recently.   Orders:    albuterol (PROVENTIL HFA,VENTOLIN HFA) 90 mcg/act inhaler; Inhale 1 puff every 6 (six) hours as needed for wheezing    Benign essential hypertension  Fairly well controlled.        EDMOND (obstructive sleep apnea)  Uses CPAP at . Saw pulmonary in .       Erectile dysfunction of non-organic origin  Reports difficulty with sustaining erection for sexual intercourse, reports normal erections with masturbation, Normal nocturnal erections, difficulty maintaining erection, has become a stressor in his relationship with his GF, so psych component suspected.    Orders:    tadalafil (CIALIS) 20 MG tablet; Take 1 tablet (20 mg total) by mouth daily as needed for erectile dysfunction    Needle phobia  Will send one time dose of valium to be used prior to lab draw.  Orders:    diazepam (VALIUM) 5 mg tablet; Take 1 tablet (5 mg total) by mouth 1 (one) time for 1 dose    Obesity, Class II, BMI 35-39.9  Continue bariatric follow up. Continue Topamax and metformin               Depression Screening and Follow-up Plan: Patient was screened for depression during today's encounter. They screened negative with a PHQ-9 score of 0.      History of Present Illness   Routine follow up.      Review of Systems   Constitutional:  Negative for chills and fever.   HENT:  Negative for ear pain and sore throat.    Eyes:  Negative for pain and visual disturbance.   Respiratory:  Negative for cough and shortness of breath.    Cardiovascular:  Negative for chest pain and palpitations.   Gastrointestinal:  Negative for abdominal pain and vomiting.   Genitourinary:  Negative for dysuria and  "hematuria.   Musculoskeletal:  Negative for arthralgias and back pain.   Skin:  Negative for color change and rash.   Neurological:  Negative for seizures and syncope.   All other systems reviewed and are negative.      Objective   /88 (BP Location: Left arm, Patient Position: Sitting, Cuff Size: Adult)   Pulse 81   Resp 16   Ht 5' 11\" (1.803 m)   Wt 119 kg (262 lb)   SpO2 97%   BMI 36.54 kg/m²      Physical Exam  Vitals and nursing note reviewed.   Constitutional:       General: He is not in acute distress.     Appearance: He is well-developed.   HENT:      Head: Normocephalic and atraumatic.   Eyes:      Conjunctiva/sclera: Conjunctivae normal.   Cardiovascular:      Rate and Rhythm: Normal rate and regular rhythm.      Heart sounds: No murmur heard.  Pulmonary:      Effort: Pulmonary effort is normal. No respiratory distress.      Breath sounds: Normal breath sounds.   Abdominal:      Palpations: Abdomen is soft.      Tenderness: There is no abdominal tenderness.   Musculoskeletal:         General: No swelling.      Cervical back: Neck supple.   Skin:     General: Skin is warm and dry.      Capillary Refill: Capillary refill takes less than 2 seconds.   Neurological:      Mental Status: He is alert.   Psychiatric:         Mood and Affect: Mood normal.         "

## 2025-03-31 NOTE — ASSESSMENT & PLAN NOTE
Reports difficulty with sustaining erection for sexual intercourse, reports normal erections with masturbation, Normal nocturnal erections, difficulty maintaining erection, has become a stressor in his relationship with his GF, so psych component suspected.    Orders:    tadalafil (CIALIS) 20 MG tablet; Take 1 tablet (20 mg total) by mouth daily as needed for erectile dysfunction

## 2025-03-31 NOTE — PROGRESS NOTES
Adult Annual Physical  Name: Balbina Regalado      : 1980      MRN: [id number]  Encounter Provider: Ayad Lam MD  Encounter Date: 3/31/2025   Encounter department: [facility] INTERNAL MEDICINE [place]    Assessment & Plan  Annual physical exam  Completed.       Moderate asthma without complication, unspecified whether persistent         Preventive Screenings:  - Diabetes Screening: risks/benefits discussed  - Cholesterol Screening: risks/benefits discussed   - Hepatitis C screening: patient declines   - HIV screening: patient declines   - Colon cancer screening: patient declines   - Lung cancer screening: screening not indicated   - Prostate cancer screening: risks/benefits discussed     Immunizations:  - Immunizations due: Influenza, Prevnar 20 and Tdap    Counseling/Anticipatory Guidance:    - Dental health: discussed importance of regular tooth brushing, flossing, and dental visits.   - Exercise: the importance of regular exercise/physical activity was discussed. Recommend exercise 3-5 times per week for at least 30 minutes.   - Injury prevention: discussed safety/seat belts, safety helmets, smoke detectors, carbon monoxide detectors, and smoking near bedding or upholstery.       Depression Screening and Follow-up Plan:   Patient with underlying depression and was advised to continue current medications as prescribed.         History of Present Illness   {?Quick Links Encounters * My Last Note * Last Note in Specialty * Snapshot * Since Last Visit * History :72984}  Adult Annual Physical:  Patient presents for annual physical.     Diet and Physical Activity:  - Diet/Nutrition: no special diet.  - Exercise: no formal exercise.    General Health:  - Sleep: sleeps well.  - Hearing: normal hearing bilateral ears.  - Vision: no vision problems.  - Dental: regular dental visits.     Health:  - History of STDs: no.   - Urinary symptoms: none.     Advanced Care Planning:  - Has an advanced  directive?: no    - Has a durable medical POA?: no    - ACP document given to patient?: no      Review of Systems   Constitutional:  Negative for chills and fever.   HENT:  Negative for ear pain and sore throat.    Eyes:  Negative for pain and visual disturbance.   Respiratory:  Negative for cough and shortness of breath.    Cardiovascular:  Negative for chest pain and palpitations.   Gastrointestinal:  Negative for abdominal pain and vomiting.   Genitourinary:  Negative for dysuria and hematuria.   Musculoskeletal:  Negative for arthralgias and back pain.   Skin:  Negative for color change and rash.   Neurological:  Negative for seizures and syncope.   All other systems reviewed and are negative.    Past Medical History   Past Medical History:   Diagnosis Date    Anxiety 2021    Asthma     Depression 2021    Difficulty attaining erection     Erectile dysfunction     Fatty liver     Fear of needles     pt will pass out in the presents of needles    HTN (hypertension)     Hypertension 2011    Kidney disease     Liver disease 2004    Fatty non alcoholic    Low HDL (under 40)     Last assessed 02/08/17    Obesity     Sleep apnea, obstructive     Wheezing      Past Surgical History:   Procedure Laterality Date    ROOT CANAL       Family History   Problem Relation Age of Onset    Hypertension Mother     Multiple sclerosis Mother     Tremor Mother     Gout Father     Multiple sclerosis Brother     Tremor Brother     Multiple sclerosis Paternal Uncle     Tremor Paternal Uncle     Asthma Family     Other Family         CVA    Cancer Family     Asthma Maternal Aunt       reports that he has never smoked. He has never used smokeless tobacco. He reports that he does not drink alcohol and does not use drugs.  Current Outpatient Medications   Medication Instructions    acyclovir (ZOVIRAX) 5 % ointment Every 6 hours as needed    albuterol (PROVENTIL HFA,VENTOLIN HFA) 90 mcg/act inhaler 1 puff, Inhalation, Every 6 hours PRN     Cholecalciferol (VITAMIN D3) 2000 units capsule 1 capsule, Daily    diazepam (VALIUM) 5 mg, Oral, Once    Digestive Enzymes (DIGESTIVE ENZYME PO) Take by mouth    meloxicam (MOBIC) 15 mg tablet PRN FOR BACK PAIN    metFORMIN (GLUCOPHAGE-XR) 750 mg 24 hr tablet Take 1 tablet by mouth with meals twice a day    topiramate (Topamax) 25 mg tablet Take 1 tablet by mouth once daily in the evening for 1 week then increase to 2 tablet  daily in the evening     Allergies   Allergen Reactions    Dust Mite Extract     Molds & Smuts     Penicillins Other (See Comments)     Unknown childhood         Objective {?Quick Links Trend Vitals * Enter New Vitals * Results Review * Timeline (Adult) * Labs * Imaging * Cardiology * Procedures * Lung Cancer Screening * Surgical eConsent :36023}  There were no vitals taken for this visit.    Physical Exam  Vitals and nursing note reviewed.   Constitutional:       General: He is not in acute distress.     Appearance: He is well-developed.   HENT:      Head: Normocephalic and atraumatic.   Eyes:      Conjunctiva/sclera: Conjunctivae normal.   Cardiovascular:      Rate and Rhythm: Normal rate and regular rhythm.      Heart sounds: No murmur heard.  Pulmonary:      Effort: Pulmonary effort is normal. No respiratory distress.      Breath sounds: Normal breath sounds.   Abdominal:      Palpations: Abdomen is soft.      Tenderness: There is no abdominal tenderness.   Musculoskeletal:         General: No swelling.      Cervical back: Neck supple.   Skin:     General: Skin is warm and dry.      Capillary Refill: Capillary refill takes less than 2 seconds.   Neurological:      Mental Status: He is alert.   Psychiatric:         Mood and Affect: Mood normal.

## 2025-07-18 ENCOUNTER — TELEMEDICINE (OUTPATIENT)
Dept: OTHER | Facility: HOSPITAL | Age: 45
End: 2025-07-18
Payer: COMMERCIAL

## 2025-07-18 ENCOUNTER — APPOINTMENT (OUTPATIENT)
Dept: LAB | Facility: HOSPITAL | Age: 45
End: 2025-07-18
Payer: COMMERCIAL

## 2025-07-18 VITALS — WEIGHT: 270 LBS | BODY MASS INDEX: 37.66 KG/M2

## 2025-07-18 DIAGNOSIS — R35.0 URINARY FREQUENCY: Primary | ICD-10-CM

## 2025-07-18 DIAGNOSIS — R35.0 URINARY FREQUENCY: ICD-10-CM

## 2025-07-18 PROBLEM — J45.909 ASTHMA: Status: RESOLVED | Noted: 2024-05-24 | Resolved: 2025-07-18

## 2025-07-18 PROBLEM — E66.01 CLASS 2 SEVERE OBESITY DUE TO EXCESS CALORIES WITH SERIOUS COMORBIDITY AND BODY MASS INDEX (BMI) OF 38.0 TO 38.9 IN ADULT (HCC): Status: RESOLVED | Noted: 2024-09-23 | Resolved: 2025-07-18

## 2025-07-18 PROBLEM — E66.812 CLASS 2 SEVERE OBESITY DUE TO EXCESS CALORIES WITH SERIOUS COMORBIDITY AND BODY MASS INDEX (BMI) OF 38.0 TO 38.9 IN ADULT (HCC): Status: RESOLVED | Noted: 2024-09-23 | Resolved: 2025-07-18

## 2025-07-18 LAB
BILIRUB UR QL STRIP: NEGATIVE
CLARITY UR: CLEAR
COLOR UR: NORMAL
GLUCOSE UR STRIP-MCNC: NEGATIVE MG/DL
HGB UR QL STRIP.AUTO: NEGATIVE
KETONES UR STRIP-MCNC: NEGATIVE MG/DL
LEUKOCYTE ESTERASE UR QL STRIP: NEGATIVE
NITRITE UR QL STRIP: NEGATIVE
PH UR STRIP.AUTO: 6.5 [PH]
PROT UR STRIP-MCNC: NEGATIVE MG/DL
SP GR UR STRIP.AUTO: 1.02 (ref 1–1.03)
UROBILINOGEN UR STRIP-ACNC: <2 MG/DL

## 2025-07-18 PROCEDURE — 99213 OFFICE O/P EST LOW 20 MIN: CPT | Performed by: PHYSICIAN ASSISTANT

## 2025-07-18 PROCEDURE — 81003 URINALYSIS AUTO W/O SCOPE: CPT

## 2025-07-18 NOTE — PROGRESS NOTES
Virtual Regular Visit  Name: Balbina Regalado      : 1980      MRN: [id number]  Encounter Provider: Shannon D Severino, PA-C  Encounter Date: 2025   Encounter department: VIRTUAL CARE       Verification of patient location:  Patient is located at Home in the following state in which I hold an active license [place] :  Assessment & Plan  Urinary frequency    Orders:    UA w Reflex to Microscopic w Reflex to Culture; Future        Encounter provider Shannon D Severino, PA-C    The patient was identified by name and date of birth. Balbina Regalado was informed that this is a telemedicine visit and that the visit is being conducted through the Epic Embedded platform. He agrees to proceed..  My office door was closed. No one else was in the room. He acknowledged consent and understanding of privacy and security of the video platform. The patient has agreed to participate and understands they can discontinue the visit at any time.    Patient is aware this is a billable service.     History obtained from: patient  History of Present Illness     Pt reports he has been having problems with urination x 1.5 days. Had urinary frequency, hesitancy, feeling of incomplete emptying, but does have a trickle of urine. Denies dysuria, abdominal pain, hematuria, discharge, back pain, fever, concern for STI. Has had a prostate exam. Denies increased thirst or hunger.       Review of Systems   Constitutional:  Negative for fever and unexpected weight change.   HENT:  Negative for nosebleeds.    Eyes:  Negative for redness.   Respiratory:  Negative for shortness of breath.    Cardiovascular:  Negative for chest pain.   Gastrointestinal:  Negative for blood in stool.   Genitourinary:  Positive for decreased urine volume, frequency and urgency. Negative for dysuria and hematuria.   Musculoskeletal:  Negative for gait problem.   Skin:  Negative for rash.   Neurological:  Negative for seizures.   Psychiatric/Behavioral:   Negative for behavioral problems.        Objective   Wt 122 kg (270 lb)   BMI 37.66 kg/m²     Physical Exam  Constitutional:       General: He is not in acute distress.     Appearance: Normal appearance. He is not toxic-appearing.   HENT:      Head: Normocephalic and atraumatic.      Nose: No rhinorrhea.      Mouth/Throat:      Mouth: Mucous membranes are moist.     Eyes:      Conjunctiva/sclera: Conjunctivae normal.     Pulmonary:      Effort: Pulmonary effort is normal. No respiratory distress.      Breath sounds: No wheezing (no gross audible wheeze through computer).     Musculoskeletal:      Cervical back: Normal range of motion.     Skin:     Findings: No rash (on face or neck).     Neurological:      Mental Status: He is alert.      Cranial Nerves: No dysarthria or facial asymmetry.     Psychiatric:         Mood and Affect: Mood normal.         Behavior: Behavior normal.         Visit Time  Total Visit Duration: 13 minutes not including the time spent for establishing the audio/video connection.

## 2025-07-18 NOTE — PATIENT INSTRUCTIONS
"Thank you for choosing to trust [facility] with your care today. Virtual visits are very convenient, but do have some limitations. Schedule a follow-up appointment with your primary care physician for recheck in person in 2-3 days-especially if symptoms aren't improving. If you cannot see your PCP, you can schedule a follow up appointment at a [facility]. Go to the emergency department if you develop any new or worsening symptoms including inability to urinate and filling up with urine, fevers, or anything else that is concerning.    Notes for work/school can be found in \"Letters\" section of "[employer] gustavo  Any referrals placed can be found under \"Upcoming Tests & Procedures\"  [facility] Phone number is [phone] if you need assistance or have further questions    [phone])  Schedule or Reschedule Outpatient Testing - Option 2  Billing - Option 3  General Info - Option 4  "[employer] Help - Option 5  Comprehensive Spine Program - Option 6      You can go to any outpatient [facility] to complete the testing that was ordered  Just provide your name and date of birth at registration and they will be able to pull up the orders.  You can search for a lab using "[employer] \"Find Care\" and filter by \"Labs\" or click the link below:  [email or web address]    The results will go directly to your "[employer] gustavo under \"Test Results\"  "

## 2025-07-24 DIAGNOSIS — E66.01 CLASS 2 SEVERE OBESITY DUE TO EXCESS CALORIES WITH SERIOUS COMORBIDITY AND BODY MASS INDEX (BMI) OF 38.0 TO 38.9 IN ADULT (HCC): ICD-10-CM

## 2025-07-24 DIAGNOSIS — E66.812 CLASS 2 SEVERE OBESITY DUE TO EXCESS CALORIES WITH SERIOUS COMORBIDITY AND BODY MASS INDEX (BMI) OF 38.0 TO 38.9 IN ADULT (HCC): ICD-10-CM

## 2025-07-25 RX ORDER — TOPIRAMATE 25 MG/1
25 TABLET, FILM COATED ORAL 2 TIMES DAILY
Qty: 180 TABLET | Refills: 1 | Status: SHIPPED | OUTPATIENT
Start: 2025-07-25